# Patient Record
Sex: FEMALE | Race: WHITE | Employment: FULL TIME | URBAN - NONMETROPOLITAN AREA
[De-identification: names, ages, dates, MRNs, and addresses within clinical notes are randomized per-mention and may not be internally consistent; named-entity substitution may affect disease eponyms.]

---

## 2017-11-08 ENCOUNTER — HOSPITAL ENCOUNTER (OUTPATIENT)
Dept: WOMENS IMAGING | Age: 47
Discharge: HOME | End: 2017-11-08
Payer: COMMERCIAL

## 2017-11-08 DIAGNOSIS — Z12.31 VISIT FOR SCREENING MAMMOGRAM: ICD-10-CM

## 2017-11-08 PROCEDURE — 77063 BREAST TOMOSYNTHESIS BI: CPT

## 2018-11-29 ENCOUNTER — HOSPITAL ENCOUNTER (OUTPATIENT)
Dept: WOMENS IMAGING | Age: 48
Discharge: HOME | End: 2018-11-29
Payer: COMMERCIAL

## 2018-11-29 DIAGNOSIS — Z12.31 VISIT FOR SCREENING MAMMOGRAM: ICD-10-CM

## 2018-11-29 PROCEDURE — 77063 BREAST TOMOSYNTHESIS BI: CPT

## 2018-12-10 ENCOUNTER — HOSPITAL ENCOUNTER (OUTPATIENT)
Dept: WOMENS IMAGING | Age: 48
Discharge: HOME | End: 2018-12-10
Payer: COMMERCIAL

## 2018-12-10 DIAGNOSIS — R92.2 BREAST DENSITY: ICD-10-CM

## 2018-12-10 PROCEDURE — 76642 ULTRASOUND BREAST LIMITED: CPT

## 2018-12-10 PROCEDURE — G0279 TOMOSYNTHESIS, MAMMO: HCPCS

## 2019-08-02 ENCOUNTER — HOSPITAL ENCOUNTER (OUTPATIENT)
Dept: WOMENS IMAGING | Age: 49
Discharge: HOME | End: 2019-08-02
Payer: COMMERCIAL

## 2019-08-02 DIAGNOSIS — Z09 FOLLOW UP: ICD-10-CM

## 2019-08-02 DIAGNOSIS — R92.2 BREAST DENSITY: ICD-10-CM

## 2019-08-02 PROCEDURE — G0279 TOMOSYNTHESIS, MAMMO: HCPCS

## 2019-08-13 ENCOUNTER — HOSPITAL ENCOUNTER (OUTPATIENT)
Dept: WOMENS IMAGING | Age: 49
Discharge: HOME | End: 2019-08-13
Payer: COMMERCIAL

## 2019-08-13 DIAGNOSIS — R92.1 CALCIFICATION OF LEFT BREAST: ICD-10-CM

## 2019-08-13 DIAGNOSIS — R92.1 BREAST CALCIFICATIONS: ICD-10-CM

## 2019-08-13 PROCEDURE — 77065 DX MAMMO INCL CAD UNI: CPT

## 2019-08-13 PROCEDURE — A4648 IMPLANTABLE TISSUE MARKER: HCPCS

## 2019-08-13 PROCEDURE — 2709999900 HC NON-CHARGEABLE SUPPLY

## 2019-08-13 PROCEDURE — 88305 TISSUE EXAM BY PATHOLOGIST: CPT

## 2019-08-13 PROCEDURE — 2720000010 HC SURG SUPPLY STERILE

## 2019-08-13 PROCEDURE — 19081 BX BREAST 1ST LESION STRTCTC: CPT

## 2019-08-14 ENCOUNTER — HOSPITAL ENCOUNTER (EMERGENCY)
Age: 49
Discharge: HOME | End: 2019-08-14
Attending: EMERGENCY MEDICINE
Payer: COMMERCIAL

## 2019-08-14 ENCOUNTER — APPOINTMENT (OUTPATIENT)
Dept: INTERVENTIONAL RADIOLOGY/VASCULAR | Age: 49
End: 2019-08-14
Payer: COMMERCIAL

## 2019-08-14 VITALS
SYSTOLIC BLOOD PRESSURE: 135 MMHG | DIASTOLIC BLOOD PRESSURE: 88 MMHG | RESPIRATION RATE: 18 BRPM | HEART RATE: 57 BPM | OXYGEN SATURATION: 100 % | WEIGHT: 157.31 LBS | TEMPERATURE: 98.3 F

## 2019-08-14 DIAGNOSIS — R22.42 LEG MASS, LEFT: ICD-10-CM

## 2019-08-14 DIAGNOSIS — L02.419 CELLULITIS AND ABSCESS OF LEG: Primary | ICD-10-CM

## 2019-08-14 DIAGNOSIS — L03.119 CELLULITIS AND ABSCESS OF LEG: Primary | ICD-10-CM

## 2019-08-14 PROCEDURE — 99215 OFFICE O/P EST HI 40 MIN: CPT

## 2019-08-14 PROCEDURE — 99283 EMERGENCY DEPT VISIT LOW MDM: CPT

## 2019-08-14 PROCEDURE — 93971 EXTREMITY STUDY: CPT

## 2019-08-14 RX ORDER — FAMOTIDINE 20 MG/1
20 TABLET, FILM COATED ORAL 2 TIMES DAILY
COMMUNITY
End: 2022-10-26

## 2019-08-14 RX ORDER — SULFAMETHOXAZOLE AND TRIMETHOPRIM 800; 160 MG/1; MG/1
1 TABLET ORAL 2 TIMES DAILY
Qty: 20 TABLET | Refills: 0 | Status: SHIPPED | OUTPATIENT
Start: 2019-08-14 | End: 2019-08-24

## 2019-08-14 RX ORDER — SULFAMETHOXAZOLE AND TRIMETHOPRIM 800; 160 MG/1; MG/1
1 TABLET ORAL EVERY 12 HOURS SCHEDULED
Status: DISCONTINUED | OUTPATIENT
Start: 2019-08-14 | End: 2019-08-14 | Stop reason: HOSPADM

## 2019-08-14 RX ORDER — ANTIARTHRITIC COMBINATION NO.2 900 MG
TABLET ORAL
COMMUNITY

## 2019-08-14 ASSESSMENT — ENCOUNTER SYMPTOMS
VOMITING: 0
EYE PAIN: 0
SHORTNESS OF BREATH: 0
BACK PAIN: 0
EYE DISCHARGE: 0
WHEEZING: 0
DIARRHEA: 0
NAUSEA: 0
COUGH: 0
RHINORRHEA: 0
SORE THROAT: 0
COLOR CHANGE: 1
ABDOMINAL PAIN: 0

## 2019-08-14 ASSESSMENT — PAIN DESCRIPTION - ORIENTATION
ORIENTATION: LEFT;LOWER
ORIENTATION: LEFT

## 2019-08-14 ASSESSMENT — PAIN DESCRIPTION - PAIN TYPE
TYPE: ACUTE PAIN
TYPE: ACUTE PAIN

## 2019-08-14 ASSESSMENT — PAIN DESCRIPTION - LOCATION
LOCATION: LEG
LOCATION: LEG

## 2019-08-14 ASSESSMENT — PAIN SCALES - GENERAL
PAINLEVEL_OUTOF10: 3
PAINLEVEL_OUTOF10: 5

## 2019-08-14 NOTE — ED PROVIDER NOTES
[address] [facility] [address] [address] EMERGENCY DEPT  Urgent Care Encounter       CHIEF COMPLAINT       Chief Complaint   Patient presents with    Mass     red bump left side, left lower leg       Nurses Notes reviewed and I agree except as noted in the HPI. HISTORY OF PRESENT ILLNESS   Regina Ramos is a 52 y.o. female who presents for evaluation of a firm, erythematous mass to her left lower extremity. Patient states that she first noticed the mass yesterday. She denies any long travel, tobacco use, history of blood clots, hormone/estrogen therapy, or recent surgeries. She denies any known swelling to her left lower extremity or any numbness/tingling. Patient states that she does have pain in her leg when she walks due to the mass. The history is provided by the patient. REVIEW OF SYSTEMS     Review of Systems   Constitutional: Negative for chills and fever. HENT: Negative for congestion and sore throat. Respiratory: Negative for cough and shortness of breath. Cardiovascular: Negative for chest pain. Gastrointestinal: Negative for nausea and vomiting. Musculoskeletal: Positive for arthralgias and gait problem. Negative for myalgias. Skin: Negative for rash. Allergic/Immunologic: Negative for environmental allergies. Neurological: Negative for weakness and headaches. PAST MEDICAL HISTORY         Diagnosis Date    Gastric inflammation        SURGICALHISTORY     Patient  has a past surgical history that includes Breast surgery (2019). CURRENT MEDICATIONS       Previous Medications    FAMOTIDINE (PEPCID) 20 MG TABLET    Take 20 mg by mouth 2 times daily    GLUCOS-CHONDROIT-COLLAG-HYAL (GLUCOSAMINE CHONDROIT-COLLAGEN PO)    Take 1 tablet by mouth    MULTIPLE VITAMINS-MINERALS (WOMENS MULTI) CAPS    Take by mouth       ALLERGIES     Patient is has no allergies on file. Patients   There is no immunization history on file for this patient.     FAMILY HISTORY     Patient's family history reveal a firm mass and there are varicosities noted in the surrounding lower leg. I discussed with the patient that I do have some concern for possible DVT and that I believe evaluation at the emergency department for this would be appropriate. She is agreeable to transfer and states that she would prefer to go to [facility] by private car. Report was called to [facility], Isaiah Wasserman 19 RN.        PATIENT REFERRED TO:  Patrick Munoz MD  [address] [facility] / [place] [address]      DISCHARGE MEDICATIONS:  New Prescriptions    No medications on file       Discontinued Medications    No medications on file       Current Discharge Medication List          Nada Chavez, APRN - CNP    (Please note that portions of this note were completed with a voice recognition program. Efforts were made to edit the dictations but occasionally words are mis-transcribed.)          GILA Conroy CNP  08/14/19 [id number]

## 2019-09-26 ENCOUNTER — HOSPITAL ENCOUNTER (EMERGENCY)
Age: 49
Discharge: HOME | End: 2019-09-26
Payer: MEDICAID

## 2019-09-26 VITALS
WEIGHT: 160 LBS | RESPIRATION RATE: 18 BRPM | HEART RATE: 62 BPM | BODY MASS INDEX: 29.44 KG/M2 | TEMPERATURE: 97.9 F | OXYGEN SATURATION: 97 % | DIASTOLIC BLOOD PRESSURE: 65 MMHG | HEIGHT: 62 IN | SYSTOLIC BLOOD PRESSURE: 134 MMHG

## 2019-09-26 DIAGNOSIS — M79.605 LEFT LEG PAIN: Primary | ICD-10-CM

## 2019-09-26 PROCEDURE — 99203 OFFICE O/P NEW LOW 30 MIN: CPT | Performed by: NURSE PRACTITIONER

## 2019-09-26 PROCEDURE — 99212 OFFICE O/P EST SF 10 MIN: CPT

## 2019-09-26 ASSESSMENT — PAIN DESCRIPTION - FREQUENCY: FREQUENCY: CONTINUOUS

## 2019-09-26 ASSESSMENT — PAIN DESCRIPTION - PROGRESSION: CLINICAL_PROGRESSION: NOT CHANGED

## 2019-09-26 ASSESSMENT — PAIN - FUNCTIONAL ASSESSMENT: PAIN_FUNCTIONAL_ASSESSMENT: ACTIVITIES ARE NOT PREVENTED

## 2019-09-26 ASSESSMENT — ENCOUNTER SYMPTOMS
VOMITING: 0
SHORTNESS OF BREATH: 0
NAUSEA: 0

## 2019-09-26 ASSESSMENT — PAIN SCALES - GENERAL: PAINLEVEL_OUTOF10: 2

## 2019-09-26 ASSESSMENT — PAIN DESCRIPTION - DESCRIPTORS: DESCRIPTORS: DISCOMFORT

## 2019-09-26 ASSESSMENT — PAIN DESCRIPTION - PAIN TYPE: TYPE: ACUTE PAIN

## 2019-09-26 ASSESSMENT — PAIN DESCRIPTION - ORIENTATION: ORIENTATION: LOWER;LEFT

## 2019-09-26 ASSESSMENT — PAIN DESCRIPTION - LOCATION: LOCATION: LEG

## 2020-02-06 ENCOUNTER — HOSPITAL ENCOUNTER (OUTPATIENT)
Dept: WOMENS IMAGING | Age: 50
Discharge: HOME | End: 2020-02-06
Payer: COMMERCIAL

## 2020-02-06 PROCEDURE — G0279 TOMOSYNTHESIS, MAMMO: HCPCS

## 2020-07-02 ENCOUNTER — NURSE ONLY (OUTPATIENT)
Dept: LAB | Age: 50
End: 2020-07-02

## 2020-07-02 LAB
ALT SERPL-CCNC: 21 U/L (ref 11–66)
BASOPHILS # BLD: 1.1 %
BASOPHILS ABSOLUTE: 0.1 THOU/MM3 (ref 0–0.1)
EOSINOPHIL # BLD: 1.7 %
EOSINOPHILS ABSOLUTE: 0.1 THOU/MM3 (ref 0–0.4)
ERYTHROCYTE [DISTWIDTH] IN BLOOD BY AUTOMATED COUNT: 13.2 % (ref 11.5–14.5)
ERYTHROCYTE [DISTWIDTH] IN BLOOD BY AUTOMATED COUNT: 50.8 FL (ref 35–45)
HCT VFR BLD CALC: 42 % (ref 37–47)
HEMOGLOBIN: 14 GM/DL (ref 12–16)
IMMATURE GRANS (ABS): 0.01 THOU/MM3 (ref 0–0.07)
IMMATURE GRANULOCYTES: 0.2 %
LYMPHOCYTES # BLD: 44.7 %
LYMPHOCYTES ABSOLUTE: 2.1 THOU/MM3 (ref 1–4.8)
MCH RBC QN AUTO: 34.5 PG (ref 26–33)
MCHC RBC AUTO-ENTMCNC: 33.3 GM/DL (ref 32.2–35.5)
MCV RBC AUTO: 103.4 FL (ref 81–99)
MONOCYTES # BLD: 10 %
MONOCYTES ABSOLUTE: 0.5 THOU/MM3 (ref 0.4–1.3)
NUCLEATED RED BLOOD CELLS: 0 /100 WBC
PLATELET # BLD: 259 THOU/MM3 (ref 130–400)
PMV BLD AUTO: 9.9 FL (ref 9.4–12.4)
RBC # BLD: 4.06 MILL/MM3 (ref 4.2–5.4)
SEG NEUTROPHILS: 42.3 %
SEGMENTED NEUTROPHILS ABSOLUTE COUNT: 2 THOU/MM3 (ref 1.8–7.7)
T4 FREE: 1.3 NG/DL (ref 0.93–1.76)
TSH SERPL DL<=0.05 MIU/L-ACNC: 1.2 UIU/ML (ref 0.4–4.2)
WBC # BLD: 4.7 THOU/MM3 (ref 4.8–10.8)

## 2021-02-18 ENCOUNTER — HOSPITAL ENCOUNTER (OUTPATIENT)
Dept: WOMENS IMAGING | Age: 51
Discharge: HOME | End: 2021-02-18
Payer: COMMERCIAL

## 2021-02-18 DIAGNOSIS — Z12.31 VISIT FOR SCREENING MAMMOGRAM: ICD-10-CM

## 2021-02-18 PROCEDURE — 77063 BREAST TOMOSYNTHESIS BI: CPT

## 2021-07-30 ENCOUNTER — HOSPITAL ENCOUNTER (OUTPATIENT)
Dept: GENERAL RADIOLOGY | Age: 51
Discharge: HOME | End: 2021-07-30
Payer: COMMERCIAL

## 2021-07-30 ENCOUNTER — HOSPITAL ENCOUNTER (OUTPATIENT)
Age: 51
Discharge: HOME | End: 2021-07-30
Payer: COMMERCIAL

## 2021-07-30 DIAGNOSIS — M25.562 LEFT KNEE PAIN, UNSPECIFIED CHRONICITY: ICD-10-CM

## 2021-07-30 PROCEDURE — 73564 X-RAY EXAM KNEE 4 OR MORE: CPT

## 2021-09-09 ENCOUNTER — HOSPITAL ENCOUNTER (OUTPATIENT)
Age: 51
Discharge: HOME | End: 2021-09-09
Payer: COMMERCIAL

## 2021-09-09 ENCOUNTER — HOSPITAL ENCOUNTER (OUTPATIENT)
Dept: GENERAL RADIOLOGY | Age: 51
Discharge: HOME | End: 2021-09-09
Payer: COMMERCIAL

## 2021-09-09 DIAGNOSIS — M48.02 CERVICAL SPINAL STENOSIS: ICD-10-CM

## 2021-09-09 LAB
ERYTHROCYTE [DISTWIDTH] IN BLOOD BY AUTOMATED COUNT: 13.6 % (ref 11.5–14.5)
ERYTHROCYTE [DISTWIDTH] IN BLOOD BY AUTOMATED COUNT: 51.8 FL (ref 35–45)
HCT VFR BLD CALC: 43.8 % (ref 37–47)
HEMOGLOBIN: 14 GM/DL (ref 12–16)
MCH RBC QN AUTO: 33 PG (ref 26–33)
MCHC RBC AUTO-ENTMCNC: 32 GM/DL (ref 32.2–35.5)
MCV RBC AUTO: 103.3 FL (ref 81–99)
PLATELET # BLD: 257 THOU/MM3 (ref 130–400)
PMV BLD AUTO: 10.1 FL (ref 9.4–12.4)
RBC # BLD: 4.24 MILL/MM3 (ref 4.2–5.4)
WBC # BLD: 4.6 THOU/MM3 (ref 4.8–10.8)

## 2021-09-09 PROCEDURE — 85610 PROTHROMBIN TIME: CPT

## 2021-09-09 PROCEDURE — 71046 X-RAY EXAM CHEST 2 VIEWS: CPT

## 2021-09-09 PROCEDURE — 87641 MR-STAPH DNA AMP PROBE: CPT

## 2021-09-09 PROCEDURE — 85027 COMPLETE CBC AUTOMATED: CPT

## 2021-09-09 PROCEDURE — 80048 BASIC METABOLIC PNL TOTAL CA: CPT

## 2021-09-09 PROCEDURE — 85730 THROMBOPLASTIN TIME PARTIAL: CPT

## 2021-09-09 PROCEDURE — 93005 ELECTROCARDIOGRAM TRACING: CPT | Performed by: PHYSICIAN ASSISTANT

## 2021-09-09 PROCEDURE — 36415 COLL VENOUS BLD VENIPUNCTURE: CPT

## 2021-09-10 LAB
ANION GAP SERPL CALCULATED.3IONS-SCNC: 13 MEQ/L (ref 8–16)
APTT: 33.8 SECONDS (ref 22–38)
BUN BLDV-MCNC: 10 MG/DL (ref 7–22)
CALCIUM SERPL-MCNC: 9.6 MG/DL (ref 8.5–10.5)
CHLORIDE BLD-SCNC: 100 MEQ/L (ref 98–111)
CO2: 26 MEQ/L (ref 23–33)
CREAT SERPL-MCNC: 0.6 MG/DL (ref 0.4–1.2)
EKG ATRIAL RATE: 76 BPM
EKG P AXIS: 59 DEGREES
EKG P-R INTERVAL: 144 MS
EKG Q-T INTERVAL: 374 MS
EKG QRS DURATION: 78 MS
EKG QTC CALCULATION (BAZETT): 420 MS
EKG R AXIS: 24 DEGREES
EKG T AXIS: 46 DEGREES
EKG VENTRICULAR RATE: 76 BPM
GFR SERPL CREATININE-BSD FRML MDRD: > 90 ML/MIN/1.73M2
GLUCOSE BLD-MCNC: 100 MG/DL (ref 70–108)
INR BLD: 0.97 (ref 0.85–1.13)
MRSA SCREEN RT-PCR: NEGATIVE
POTASSIUM SERPL-SCNC: 4.2 MEQ/L (ref 3.5–5.2)
SODIUM BLD-SCNC: 139 MEQ/L (ref 135–145)

## 2021-09-27 ENCOUNTER — NURSE ONLY (OUTPATIENT)
Dept: LAB | Age: 51
End: 2021-09-27

## 2021-09-27 DIAGNOSIS — Z13.6 SCREENING FOR ISCHEMIC HEART DISEASE: ICD-10-CM

## 2021-09-27 LAB
CHOLESTEROL, TOTAL: 224 MG/DL (ref 100–199)
HDLC SERPL-MCNC: 94 MG/DL
LDL CHOLESTEROL CALCULATED: 108 MG/DL
TRIGL SERPL-MCNC: 110 MG/DL (ref 0–199)

## 2022-02-21 ENCOUNTER — HOSPITAL ENCOUNTER (OUTPATIENT)
Dept: WOMENS IMAGING | Age: 52
Discharge: HOME | End: 2022-02-21
Payer: COMMERCIAL

## 2022-02-21 DIAGNOSIS — Z12.31 VISIT FOR SCREENING MAMMOGRAM: ICD-10-CM

## 2022-02-21 PROCEDURE — 77063 BREAST TOMOSYNTHESIS BI: CPT

## 2022-11-07 ENCOUNTER — INITIAL CONSULT (OUTPATIENT)
Dept: PULMONOLOGY | Age: 52
End: 2022-11-07
Payer: COMMERCIAL

## 2022-11-07 VITALS
SYSTOLIC BLOOD PRESSURE: 148 MMHG | DIASTOLIC BLOOD PRESSURE: 90 MMHG | HEART RATE: 80 BPM | WEIGHT: 201.8 LBS | TEMPERATURE: 97.8 F | HEIGHT: 62 IN | BODY MASS INDEX: 37.13 KG/M2 | OXYGEN SATURATION: 98 %

## 2022-11-07 DIAGNOSIS — G47.9 SLEEP DISTURBANCE: ICD-10-CM

## 2022-11-07 DIAGNOSIS — E66.9 OBESITY (BMI 30-39.9): ICD-10-CM

## 2022-11-07 DIAGNOSIS — G47.19 EXCESSIVE DAYTIME SLEEPINESS: Primary | ICD-10-CM

## 2022-11-07 DIAGNOSIS — R06.83 SNORING: ICD-10-CM

## 2022-11-07 DIAGNOSIS — K21.9 GASTROESOPHAGEAL REFLUX DISEASE WITHOUT ESOPHAGITIS: ICD-10-CM

## 2022-11-07 DIAGNOSIS — G47.8 UNREFRESHED BY SLEEP: ICD-10-CM

## 2022-11-07 PROCEDURE — 99203 OFFICE O/P NEW LOW 30 MIN: CPT | Performed by: INTERNAL MEDICINE

## 2022-11-07 NOTE — PROGRESS NOTES
New Sleep Patient H/P    Presentation:  Mallorie Stinson is referred by Soto Stone MD for  [place]    Shelby c/o 40 lb wt gain over the last few years, loud snoring, non restorative, fragmented  sleep, feeling unrefreshed in the morning, difficulty concentrating at work. Her blood pressure has been elevated at the doctors offices lately---152/92 today    H/O Grave's disease, GERD      Time in Bed:   Bedtime: 12a.m. Awakens  7 a.m. Different on weekends? No       Shelby falls asleep in 25  minutes. Any awakenings? Yes  Difficulty Falling back to sleep? Yes  Symptoms began:  a few years ago. Symptoms include: snoring, excessive daytime sleepiness, disrupted sleep    Previous evaluation and treatment? No        She denies any history of sleep walking or sleep talking. No history of seizures activity. No history suggestive of restless legs syndrome. No history of bruxism. No history of head injury. Naps:  Any naps? No and are they helpful No    Snoring and Apneas:  Do you snore or been told you a snore? Yes  How long have known about your snoring? years  Any witnessed apneas? No  Any awakenings with choking or gasping? No    Dreams:  Any recurring dreams? No  Hallucinations? No  Sleep Paralysis? No  Symptoms of Cataplexy? No    Driving History:  Do you have a CDL or drive long distances for work? No  Any driving accidents in the past year? No  Any sleepiness while driving? No    Weight:  Any change in weight over the past year? No   How about past 5 years?  No  How much? 40      Past Medical History:   Diagnosis Date    Gastric inflammation        Past Surgical History:   Procedure Laterality Date    BREAST SURGERY  2019    biopsy, left breast    TE STEROTACTIC LOC BREAST BIOPSY LEFT Left 08/13/2019    benign    SPINE SURGERY N/A 09/29/2021    fusion - [facility]       Social History     Tobacco Use    Smoking status: Never    Smokeless tobacco: Never   Vaping Use    Vaping Use: Never used   Substance Use Topics    Alcohol use: Yes     Comment: social    Drug use: Never       Allergies   Allergen Reactions    Erythromycin     Tetracyclines & Related        Current Outpatient Medications   Medication Sig Dispense Refill    valACYclovir (VALTREX) 500 MG tablet take 1 tablet by mouth once daily      omeprazole (PRILOSEC) 40 MG delayed release capsule Take 1 capsule by mouth daily 30 capsule 11    Multiple Vitamins-Minerals (WOMENS MULTI) CAPS Take by mouth      Glucos-Chondroit-Collag-Hyal (GLUCOSAMINE CHONDROIT-COLLAGEN PO) Take 1 tablet by mouth       No current facility-administered medications for this visit. Family History   Problem Relation Age of Onset    Heart Disease Mother     Diabetes Father     High Cholesterol Father     Breast Cancer Paternal Cousin 43        Any family history of any sleep problems or any one in your family on CPAP? No    Social History     Tobacco Use    Smoking status: Never    Smokeless tobacco: Never   Vaping Use    Vaping Use: Never used   Substance Use Topics    Alcohol use: Yes     Comment: social    Drug use: Never     Caffeine Intake: How much soda (pop), coffee, tea, power drinks do you ingest per day? 1 per day. Employment History:  Where do you work?    What are your shifts? 8a to 3 p    Any recent changes in shifts or hours? No    Review of Systems:   General/Constitutional: No recent loss of weight or appetite changes. No fever or chills. HENT: Negative. Eyes: Negative. Upper respiratory tract: No nasal stuffiness or post nasal drip. Lower respiratory tract/ lungs: No cough or sputum production. No hemoptysis. Cardiovascular: No palpitations or chest pain. Gastrointestinal: No nausea or vomiting. Neurological: No focal neurologiacal weakness. Extremities: No edema. Musculoskeletal: No complaints. Genitourinary: No complaints. Hematological: Negative. Psychiatric/Behavioral: Negative. Skin: No itching.   Physical Exam:    HEIGHTHeight: 5' 2\" (157.5 cm) WEIGHTWeight: 201 lb 12.8 oz (91.5 kg)    BMI:  There is no height or weight on file to calculate BMI. Neck Size: 14.75      ESS: 3 SAQLI:67  Vitals:   Vitals:    11/07/22 1042 11/07/22 1051   BP: (!) 152/92 (!) 148/90   Site: Left Upper Arm    Position: Sitting    Cuff Size: Large Adult    Pulse: 80    Temp: 97.8 °F (36.6 °C)    TempSrc: Oral    SpO2: 98%  Comment: room    Weight: 201 lb 12.8 oz (91.5 kg)    Height: 5' 2\" (1.575 m)          Mallampati Score: 4    Physical Exam :  Constitutional: BMI 36.91  HENT:   Head: Normocephalic and atraumatic. Mouth/Throat: Large tongue, crowded pharynx, mallampati class 4  Eyes: Conjunctivae are normal. PERRLA. No scleral icterus. Neck: Neck supple. No JVD present. No tracheal deviation present. Cardiovascular: Normal rate, regular rhythm, normal heart sounds. No murmur heard. Pulmonary/Chest: Effort normal and breath sounds normal. No stridor. No respiratory distress. No wheezes. No rales. Abdominal: Soft. No distension. No tenderness. Musculoskeletal: Normal range of motion. Lymphadenopathy:  No cervical adenopathy. Neurological: Alert and oriented to person, place, and time. No focal deficits. Skin: Skin is warm and dry. Patient is not diaphoretic. Psychiatric: Normal behavior with normal mood and affect. Diagnostic Data:    Assessment      Diagnosis Orders   1. Excessive daytime sleepiness  Baseline Diagnostic Sleep Study      2. Snoring  Baseline Diagnostic Sleep Study      3. Unrefreshed by sleep  Baseline Diagnostic Sleep Study      4. Obesity (BMI 30-39. 9)  Baseline Diagnostic Sleep Study      5. Gastroesophageal reflux disease without esophagitis  Baseline Diagnostic Sleep Study      6.  Sleep disturbance  Baseline Diagnostic Sleep Study            Plan   Orders Placed This Encounter   Procedures    Baseline Diagnostic Sleep Study     Standing Status:   Future     Standing Expiration Date:   11/7/2023 Order Specific Question:   Adult or Pediatric     Answer:   Adult Study (>7 Years)         Mask Desensitization and Pre study teaching? No  Weight Loss Information Given? Yes  Sleep Hygiene Discussed? Yes    -She was advised to call [employer] regarding supplies if needed.  -She call my office for earlier appointment if needed for worsening of sleep symptoms.  -Julian Mneg educated about my impression and plan. Patient verbalizes understanding.

## 2022-12-12 ENCOUNTER — HOSPITAL ENCOUNTER (OUTPATIENT)
Dept: PHYSICAL THERAPY | Age: 52
Setting detail: THERAPIES SERIES
Discharge: HOME | End: 2022-12-12
Payer: COMMERCIAL

## 2022-12-12 PROCEDURE — 97161 PT EVAL LOW COMPLEX 20 MIN: CPT

## 2022-12-12 NOTE — PROGRESS NOTES
** PLEASE SIGN, DATE AND TIME CERTIFICATION BELOW AND RETURN TO [facility] OUTPATIENT REHABILITATION (FAX #: [phone]). ATTEST/CO-SIGN IF ACCESSING VIA [employer]. THANK YOU.**    I certify that I have examined the patient below and determined that Physical Medicine and Rehabilitation service is necessary and that I approve the established plan of care for up to 90 days or as specifically noted. Attestation, signature or co-signature of physician indicates approval of certification requirements.    ________________________ ____________ __________  Physician Signature   Date   Time  [address] [facility]  PHYSICAL THERAPY  [x] EVALUATION  [] DAILY NOTE (LAND) [] DAILY NOTE (AQUATIC ) [] PROGRESS NOTE [] DISCHARGE NOTE    [x] [address] [facility]   [] [address]    [] [address]   [] German Ramírez    Date: 2022  Patient Name:  Blaise Dewey  : 1970  MRN: [id number]  CSN: [id number]    Referring Practitioner Freeman Zuniga MD   Diagnosis Encounter for other orthopedic aftercare [Z47.89]  Cervicalgia [M54.2]    Treatment Diagnosis Cervical pain   Date of Evaluation 22    Additional Pertinent History ACDF 2021, Arthritis      Functional Outcome Measure Used Neck Disability Index   Functional Outcome Score 14% impaired (22)       Insurance: Primary: Payor: Vee Lang /  /  / ,   Secondary:  SPECIALTY BILLING   Authorization Information: PRE CERTIFICATION REQUIRED: YES, AFTER EVAL THRU ABIGAIL @ [phone]  INSURANCE THERAPY BENEFIT:  20 VISITS  [address] THERAPY COVERED: YES  MODALITIES COVERED:  YES   Visit # 1, 1/10 for progress note   Visits Allowed: 1   Recertification Date:    Physician Follow-Up: Does not know of a follow up with doctor   Physician Orders: Eval and treat   History of Present Illness: Patient had surgery on neck in 2021.   Patient reports when she sits for a longer period of time  and when she looks up, she has pain. Patient reports at work if she's sitting and starts having pain, she gets up and walks which helps the pain. Reports her work station is pretty ergonomic. SUBJECTIVE: Pain in neck getting worse gradually since surgery. Social/Functional History and Current Status:  Medications and Allergies have been reviewed and are listed on Medical History Questionnaire. Loring Litten lives with significant other in a single story home with stairs and a handrail to enter. Task Previous Current   ADLs  Independent Independent   IADL's Independent Independent   Ambulation Independent Independent   Transfers Independent Independent   Recreation Independent Independent   Community Integration Independent Independent   Driving Active  Active    Work [employer]. Occupation: -sitting most of the day at work   [employer]. OBJECTIVE:    Pain: 6/10 neck and sometimes down right shoulder   Palpation Significant increased tightness bilateral upper trap and neck   Posture Slight forward head, forward rounded shoulders   Range of Motion Cervical=50% loss. (ACDF 9/21)   Strength WFL   Coordination WFL   Sensation Intermittent tingling in lower cervical spine   Bed Mobility NT   Transfers Mod I   Ambulation Ambulates with no AD, good pace   Stairs NT   Balance Safe with no AD   Sleeping Sleeps on stomach. Sleeps with 1 pillow under head. Has a firm, supportive mattress.          TREATMENT   Precautions: ACDF September 2021, Arthritis   Pain: 6/10 neck    \"X in shaded column indicates activity completed today    *\" next to exercise/intervention indicates progression   Modalities Parameters/  Location  Notes                     Manual Therapy Time/Technique  Notes                     Exercise/  Intervention   Notes                                                                                  Specific Interventions Next Treatment: Modalities as needed for cervical pain and tightness, cervical stretching and strengthening, posture    Activity/Treatment Tolerance:  [x]  Patient tolerated treatment well  []  Patient limited by fatigue  []  Patient limited by pain   []  Patient limited by medical complications  []  Other:     Assessment: 46year old presents to therapy following surgery on neck in 2021. She is tight and is having increased pain with certain activities. She will benefit from therapy to assist with decreasing pain, decreasing tightness, increasing cervical ROM. Body Structures/Functions/Activity Limitations: impaired activity tolerance, impaired endurance, impaired ROM, impaired sensation, impaired strength, pain, and abnormal posture  Prognosis: good    GOALS:  Patient Goal: \"I would like to be able to move my neck better, get rid of tingling in neck, and to not have pain when I look up. \"    Short Term Goals:  Time Frame: 4 weeks  Improve NDI to <10% impaired to assist with duties at work. Decrease pain in neck to no more than 5/10 to assist with sitting at work. Improve cervical ROM to 25% loss or less to assist with turning head when driving. Improve posture needing no cues for correction to assist with preventing injury. Patient able to sit at work for 30 minutes with no increase in pain to assist with daily work duties. Long Term Goals:  Time Frame: 12 weeks  Independent with HEP and with progression to assist with decreasing pain. Patient Education:   [x]  HEP/Education Completed: Plan of Care, Goals  [facility] Access Code:  []  No new Education completed  []  Reviewed Prior HEP      []  Patient verbalized and/or demonstrated understanding of education provided. []  Patient unable to verbalize and/or demonstrate understanding of education provided. Will continue education.   [x]  Barriers to learning: none per patient    PLAN:  Treatment Recommendations: Strengthening, Range of Motion, Balance Training, Functional Mobility Training, Manual Therapy - Soft Tissue Mobilization, Pain Management, Home Exercise Program, Patient Education, Safety Education and Training, Self-Care Education and Training, Positioning, Integrative Dry Needling, Aquatics, and Modalities    [x]  Plan of care initiated. Plan to see patient 2 times per week for up to 12 weeks to address the treatment planned outlined above.   []  Continue with current plan of care  []  Modify plan of care as follows:    []  Hold pending physician visit  []  Discharge    Time In 0900   Time Out 0955   Timed Code Minutes: 0 min   Total Treatment Time: 55 min       Electronically Signed by: Sachin Thomas PT

## 2022-12-15 ENCOUNTER — HOSPITAL ENCOUNTER (OUTPATIENT)
Dept: PHYSICAL THERAPY | Age: 52
Setting detail: THERAPIES SERIES
Discharge: HOME | End: 2022-12-15
Payer: COMMERCIAL

## 2022-12-15 PROCEDURE — 97110 THERAPEUTIC EXERCISES: CPT

## 2022-12-15 PROCEDURE — 97140 MANUAL THERAPY 1/> REGIONS: CPT

## 2022-12-15 NOTE — PROGRESS NOTES
[address] [facility]  PHYSICAL THERAPY  [] EVALUATION  [x] DAILY NOTE (LAND) [] DAILY NOTE (AQUATIC ) [] PROGRESS NOTE [] DISCHARGE NOTE    [x] OUTPATIENT REHABILITATION CENTER [facility]   [] [place] [address]    [] [facility]   [] Deana Zhou    Date: 12/15/2022  Patient Name:  Brenna Marrero  : 1970  MRN: [id number]  CSN: [id number]    Referring Practitioner Brian Vogel MD   Diagnosis Encounter for other orthopedic aftercare [Z47.89]  Cervicalgia [M54.2]    Treatment Diagnosis Cervical pain   Date of Evaluation 22    Additional Pertinent History ACDF 2021, Arthritis      Functional Outcome Measure Used Neck Disability Index   Functional Outcome Score 14% impaired (22)       Insurance: Primary: Payor: [employer] /  /  / ,   Secondary:    Authorization Information: PRE CERTIFICATION REQUIRED: YES, AFTER EVAL THRU ABIGAIL @ [phone]  INSURANCE THERAPY BENEFIT:  20 VISITS PER [facility] 7: YES  MODALITIES COVERED:  YES  -RECEIVED AUTH FOR PT  TOTAL OF 10 VISITS   FROM 22 TO 22  NO SPECIFIC CPT CODES   Visit # 2,  for progress note   Visits Allowed: 11   Recertification Date:    Physician Follow-Up: Does not know of a follow up with doctor   Physician Orders: Eval and treat   History of Present Illness: Patient had surgery on neck in 2021. Patient reports when she sits for a longer period of time  and when she looks up, she has pain. Patient reports at work if she's sitting and starts having pain, she gets up and walks which helps the pain. Reports her work station is pretty ergonomic. SUBJECTIVE: Pt feels neck is doing ok for the most part. Today, she feels ROM is doing ok. She usually feels \"creaky\" in neck in the mornings.  Pain is situational.         TREATMENT   Precautions: ACDF 2021, Arthritis   Pain: 0/10 neck    \"X in shaded column indicates activity completed today    *\" next to exercise/intervention indicates progression   Modalities Parameters/  Location  Notes                     Manual Therapy Time/Technique  Notes   Gentle traction, suboccipital release, MFR to B upper trap 8 min x                Exercise/  Intervention   Notes   Backward shoulder rolls 10  x    Scapular retraction 2x10x3 sec  x    Cervical retraction 2x10x3 sec  x    Cervical rotation 10x3 sec  x    Upper trap and levator stretch 3x20 sec ea  x Limited range with right levator   Upper thoracic stretch at // bars 3x20 sec  x    Cervical isometrics: flexion, extension, rotation, lateral flexion 5x5 sec  x                                  Specific Interventions Next Treatment: Modalities as needed for cervical pain and tightness, cervical stretching and strengthening, posture    Activity/Treatment Tolerance:  [x]  Patient tolerated treatment well  []  Patient limited by fatigue  []  Patient limited by pain   []  Patient limited by medical complications  []  Other:     Assessment: Initiated cervical ROM and gentle strengthening program. Ended with gentle manual work, which pt notes helped. Pt c/o tingling sensation in posterior shoulder/scpaula during session. GOALS:  Patient Goal: \"I would like to be able to move my neck better, get rid of tingling in neck, and to not have pain when I look up. \"    Short Term Goals:  Time Frame: 4 weeks  Improve NDI to <10% impaired to assist with duties at work. Decrease pain in neck to no more than 5/10 to assist with sitting at work. Improve cervical ROM to 25% loss or less to assist with turning head when driving. Improve posture needing no cues for correction to assist with preventing injury. Patient able to sit at work for 30 minutes with no increase in pain to assist with daily work duties. Long Term Goals:  Time Frame: 12 weeks  Independent with HEP and with progression to assist with decreasing pain.         Patient Education:   [x]  HEP/Education Completed: ROM exercises completed above with handout provided  [email or web address] Access Code: [address]  []  No new Education completed  []  Reviewed Prior HEP      [x]  Patient verbalized and/or demonstrated understanding of education provided. []  Patient unable to verbalize and/or demonstrate understanding of education provided. Will continue education. []  Barriers to learning: none per patient    PLAN:  Treatment Recommendations: Strengthening, Range of Motion, Balance Training, Functional Mobility Training, Manual Therapy - Soft Tissue Mobilization, Pain Management, Home Exercise Program, Patient Education, Safety Education and Training, Self-Care Education and Training, Positioning, Integrative Dry Needling, Aquatics, and Modalities    []  Plan of care initiated. Plan to see patient 2 times per week for up to 12 weeks to address the treatment planned outlined above.   [x]  Continue with current plan of care  []  Modify plan of care as follows:    []  Hold pending physician visit  []  Discharge    Time In 0831   Time Out 0909   Timed Code Minutes: 38 min   Total Treatment Time: 38 min       Electronically Signed by: Aron Moncada, PT

## 2022-12-19 ENCOUNTER — HOSPITAL ENCOUNTER (OUTPATIENT)
Dept: SLEEP CENTER | Age: 52
Discharge: HOME | End: 2022-12-21
Payer: COMMERCIAL

## 2022-12-19 ENCOUNTER — APPOINTMENT (OUTPATIENT)
Dept: PHYSICAL THERAPY | Age: 52
End: 2022-12-19
Payer: COMMERCIAL

## 2022-12-19 DIAGNOSIS — G47.9 SLEEP DISTURBANCE: ICD-10-CM

## 2022-12-19 DIAGNOSIS — R06.83 SNORING: ICD-10-CM

## 2022-12-19 DIAGNOSIS — E66.9 OBESITY (BMI 30-39.9): ICD-10-CM

## 2022-12-19 DIAGNOSIS — G47.8 UNREFRESHED BY SLEEP: ICD-10-CM

## 2022-12-19 DIAGNOSIS — G47.19 EXCESSIVE DAYTIME SLEEPINESS: ICD-10-CM

## 2022-12-19 DIAGNOSIS — K21.9 GASTROESOPHAGEAL REFLUX DISEASE WITHOUT ESOPHAGITIS: ICD-10-CM

## 2022-12-19 PROCEDURE — 95810 POLYSOM 6/> YRS 4/> PARAM: CPT

## 2022-12-20 LAB — STATUS: NORMAL

## 2022-12-21 ENCOUNTER — HOSPITAL ENCOUNTER (OUTPATIENT)
Dept: PHYSICAL THERAPY | Age: 52
Setting detail: THERAPIES SERIES
Discharge: HOME | End: 2022-12-21
Payer: COMMERCIAL

## 2022-12-21 PROCEDURE — 97140 MANUAL THERAPY 1/> REGIONS: CPT

## 2022-12-21 PROCEDURE — 97110 THERAPEUTIC EXERCISES: CPT

## 2022-12-21 NOTE — PROGRESS NOTES
[address] [facility]  PHYSICAL THERAPY  [] EVALUATION  [x] DAILY NOTE (LAND) [] DAILY NOTE (AQUATIC ) [] PROGRESS NOTE [] DISCHARGE NOTE    [x] OUTPATIENT REHABILITATION CENTER [facility]   [] [place] [address]    [] [facility]   [] Ritu Batch    Date: 2022  Patient Name:  Rishi Robles  : 1970  MRN: [id number]  CSN: [id number]    Referring Practitioner Rosa Mccollum MD   Diagnosis Encounter for other orthopedic aftercare [Z47.89]  Cervicalgia [M54.2]    Treatment Diagnosis Cervical pain   Date of Evaluation 22    Additional Pertinent History ACDF 2021, Arthritis      Functional Outcome Measure Used Neck Disability Index   Functional Outcome Score 14% impaired (22)       Insurance: Primary: Payor: Renato Grant /  /  / ,   Secondary:    Authorization Information: PRE CERTIFICATION REQUIRED: YES, AFTER EVAL THRU ABIGAIL @ [phone]  INSURANCE THERAPY BENEFIT:  [facility] YES  MODALITIES COVERED:  YES  -RECEIVED AUTH FOR PT  TOTAL OF 10 VISITS   FROM 22 TO 22  NO SPECIFIC CPT CODES   Visit # 3, 3/11 for progress note   Visits Allowed: 11   Recertification Date:    Physician Follow-Up: Does not know of a follow up with doctor   Physician Orders: Eval and treat   History of Present Illness: Patient had surgery on neck in 2021. Patient reports when she sits for a longer period of time  and when she looks up, she has pain. Patient reports at work if she's sitting and starts having pain, she gets up and walks which helps the pain. Reports her work station is pretty ergonomic. SUBJECTIVE: Patient states she had some tingling between the shoulder blades after last therapy session but no increased pain. Reports pain is usually worse while she is at work.        TREATMENT   Precautions: ACDF 2021, Arthritis   Pain: 0/10 neck    \"X in shaded column indicates activity completed today    *\" next to exercise/intervention indicates progression   Modalities Parameters/  Location  Notes                     Manual Therapy Time/Technique  Notes   Gentle traction, suboccipital release, MFR to B upper trap 8 min x                Exercise/  Intervention   Notes   Backward shoulder rolls 15*x  X    Scapular retraction 2x10x3 sec  X    Cervical retraction 2x10x3 sec  X    Cervical rotation 10x3 sec  X    Upper trap and levator stretch 3x20 sec ea  X Limited range with right levator   Upper thoracic stretch at // bars 3x20 sec  X    Posterior capsule stretch * 3x20 sec  X    Cervical isometrics: flexion, extension, rotation, lateral flexion 5x5 sec  X                                  Specific Interventions Next Treatment: Modalities as needed for cervical pain and tightness, cervical stretching and strengthening, posture    Activity/Treatment Tolerance:  [x]  Patient tolerated treatment well  []  Patient limited by fatigue  []  Patient limited by pain   []  Patient limited by medical complications  []  Other:     Assessment: Continued with stretches and strengthening as noted above and concluded session with manual work. Tightness noted during manual techniques but patient felt better after and denies pain at conclusion of therapy. Cervical retractions produced mild tingling sensation at lower cervical/upper thoracic region. GOALS:  Patient Goal: \"I would like to be able to move my neck better, get rid of tingling in neck, and to not have pain when I look up. \"    Short Term Goals:  Time Frame: 4 weeks  Improve NDI to <10% impaired to assist with duties at work. Decrease pain in neck to no more than 5/10 to assist with sitting at work. Improve cervical ROM to 25% loss or less to assist with turning head when driving. Improve posture needing no cues for correction to assist with preventing injury.   Patient able to sit at work for 30 minutes with no increase in pain to assist with daily work duties. Long Term Goals:  Time Frame: 12 weeks  Independent with HEP and with progression to assist with decreasing pain. Patient Education:   [x]  HEP/Education Completed: Continue HEP, monitor pain and tightness, use of heat/ice  Medbridge Access Code: Jarad Mcdowell  []  No new Education completed  []  Reviewed Prior HEP      [x]  Patient verbalized and/or demonstrated understanding of education provided. []  Patient unable to verbalize and/or demonstrate understanding of education provided. Will continue education. []  Barriers to learning: none per patient    PLAN:  Treatment Recommendations: Strengthening, Range of Motion, Balance Training, Functional Mobility Training, Manual Therapy - Soft Tissue Mobilization, Pain Management, Home Exercise Program, Patient Education, Safety Education and Training, Self-Care Education and Training, Positioning, Integrative Dry Needling, Aquatics, and Modalities    []  Plan of care initiated. Plan to see patient 2 times per week for up to 12 weeks to address the treatment planned outlined above.   [x]  Continue with current plan of care  []  Modify plan of care as follows:    []  Hold pending physician visit  []  Discharge    Time In 0830   Time Out 0912   Timed Code Minutes: 42 min   Total Treatment Time: 42 min       Electronically Signed by: Beny Wei PTA

## 2022-12-21 NOTE — PROGRESS NOTES
[address] [place] [address]                               SLEEP STUDY REPORT    PATIENT NAME: Aleksandar Pearce               :        1970  MED REC NO:   [id number]                           ROOM:  ACCOUNT NO:   [de-identified]                           ADMIT DATE: 2022  PROVIDER:     Lakshmi Rodriguez. MD Marielena    DATE OF STUDY:  2022    REFERRING PROVIDER:  Nandini Myers MD    The patient's height is 62 inches, weight is 201 pounds with a BMI of  36.8. HISTORY:  The patient is a 78-year-old female who was initially  evaluated by Ge Ho MD, on 2022. The patient gave a history  of snoring and unrefreshed sleep. The patient is scheduled for sleep  study to evaluate for sleep apnea as an etiology for hypersomnia. METHODS:  The patient underwent digital polysomnography in compliance  with the standards and specifications from the AASM Manual including the  simultaneous recording of 3 EEG channels (F4-M1, C4-M1, and O2-M1 with  back up electrodes F3-M2, C3-M2, and O1-M2), 2 EOG channels (E1-M2, and  E2-M1,), EMG (chin, left & right leg), EKG, Nonin pulse oximetry with   less than 2 second averaging time, body position, airflow recorded by  oral-nasal thermal sensor and nasal air pressure transducer, plus  respiratory effort recorded by calibrated respiratory inductance  plethysmography (RIP), flow volume loop, sound and video. Sleep staging  and scoring followed the standard put forth by the American Academy of  Sleep Medicine and utilized the 1B obstructive hypopnea event  desaturation of 4 percent or greater. INTERPRETATION:  This is a baseline sleep study and the study was  performed on 2022.   The study was started at 10:20 p.m. and was  terminated at 05:39 a.m. with a total recording time of 439.2 minutes,  sleep period time was 426.3 minutes, total sleep time was 377.3 minutes,  and overall sleep efficiency was 85.9%. The sleep onset latency was  12.9 minutes, wake after sleep onset was 49 minutes, and REM sleep  latency was 369.5 minutes. SLEEP STAGING AND DISTRIBUTION SUMMARY:  Revealed the patient spent  103.5 minutes in stage I consisting of 27.4% of total sleep time, 219  minutes in stage II consisting of 58%, 54.8 minutes in REM sleep  consisting of 14.5% of total sleep time. The patient had absent slow  wave sleep. RESPIRATORY EVENT ANALYSIS:  Revealed the patient had a total of 302  apneas. Out of 302, 274 were obstructive and 27 were central and 1 was  mixed in nature. The patient also had a total of 89 hypopneas, all of  them were obstructive in nature. The total number of apneas and  hypopneas recorded during the study was 391 with an apnea-hypopnea index  of 62.2. The patient's REM sleep apnea-hypopnea index was 48.1. POSITION ANALYSIS:  Revealed the patient spent 61.5 minutes in supine  position, 278.2 minutes in prone position, 0.4 minutes in left lateral  position, 37.2 minutes in right lateral position with a supine  apnea-hypopnea index of 82 whereas prone position apnea-hypopnea index  was 51.8, left lateral position apnea-hypopnea index was 144, right  lateral position apnea-hypopnea index was 98.3. PERIODIC LIMB MOVEMENT ANALYSIS:  Revealed the patient did not have any  periodic limb movements. The patient had a total of 53 spontaneous  arousals with a spontaneous arousal index of 8.4. OXYGEN SATURATION MONITORING:  Revealed the patient had a maximum oxygen  desaturation to 82% with a mean oxygen saturation of 93.8%. The patient  spent a total of 9.4 minutes below oxygen saturation less than 88%. EKG MONITORING:  Revealed normal sinus rhythm. IMPRESSION:  1. Severe obstructive sleep apnea with worsening of respiratory events  in supine position. 2.  Decreased and delayed REM sleep limiting the interpretation of the  sleep study. 3.  Nocturnal hypoxia. The patient spent a total of 9.4 minutes below  oxygen saturation less than 88%, most likely due to sleep apnea. 4.  Hypersomnia, by history; however, the [place] Sleepiness Score given  was only 3.    RECOMMENDATIONS:  1. For the patient's sleep-disordered breathing, the patient needs  treatment. 2.  If the patient chooses to go for CPAP titration as a treatment, the  patient should be scheduled for in-lab CPAP titration as soon as  possible. The patient to follow up with Shakila Brown MD's clinic in six  to eight weeks on recommended CPAP therapy for clinical reevaluation  with review of download. 3.  If the patient wishes to discuss her sleep study reports, the  patient should be scheduled for followup with Shakila Brown MD's clinic  as soon as possible. Thanks to Shabbir Hooker MD, for giving me this opportunity to participate  in the care of this pleasant lady.         Eleanor Turner MD    D: 12/20/2022 20:04:43       T: 12/21/2022 14:56:07     [email or web address]  Job#: [id number]     Doc#: [id number]    CC:

## 2022-12-28 ENCOUNTER — APPOINTMENT (OUTPATIENT)
Dept: PHYSICAL THERAPY | Age: 52
End: 2022-12-28
Payer: COMMERCIAL

## 2022-12-28 ENCOUNTER — HOSPITAL ENCOUNTER (OUTPATIENT)
Dept: PHYSICAL THERAPY | Age: 52
Setting detail: THERAPIES SERIES
Discharge: HOME | End: 2022-12-28
Payer: COMMERCIAL

## 2022-12-28 PROCEDURE — 97110 THERAPEUTIC EXERCISES: CPT

## 2022-12-28 PROCEDURE — 97140 MANUAL THERAPY 1/> REGIONS: CPT

## 2022-12-28 NOTE — PROGRESS NOTES
shaded column indicates activity completed today    *\" next to exercise/intervention indicates progression   Modalities Parameters/  Location  Notes                     Manual Therapy Time/Technique  Notes   Gentle traction, suboccipital release, MFR to B upper trap 10 min x                Exercise/  Intervention   Notes   Backward shoulder rolls 15x  X    Scapular retraction 2x10x3 sec  X    Cervical retraction 2x10x3 sec  X    Cervical rotation 10x3 sec  X    Upper trap and levator stretch 3x20 sec UT  3x10 sec Lev  X    Upper thoracic stretch at // bars 3x20 sec      Posterior capsule stretch  3x20 sec  X    Cervical isometrics: flexion, extension, rotation, lateral flexion 5x5 sec  X Lateral flexion triggered tingling at C7-T1          Upper thoracic stretch with yellow stability ball- 3 way* 5x10 sec  x                    Specific Interventions Next Treatment: Modalities as needed for cervical pain and tightness, cervical stretching and strengthening, posture    Activity/Treatment Tolerance:  [x]  Patient tolerated treatment well  []  Patient limited by fatigue  []  Patient limited by pain   []  Patient limited by medical complications  []  Other:     Assessment: Multiple exercises triggered tingling sensation at C7-T2 region. Upper thoracic stretch completed with stability ball today. Ended with manual work to decrease tightness. Authorization date extension requested. GOALS:  Patient Goal: \"I would like to be able to move my neck better, get rid of tingling in neck, and to not have pain when I look up. \"    Short Term Goals:  Time Frame: 4 weeks  Improve NDI to <10% impaired to assist with duties at work. Decrease pain in neck to no more than 5/10 to assist with sitting at work. Improve cervical ROM to 25% loss or less to assist with turning head when driving. Improve posture needing no cues for correction to assist with preventing injury.   Patient able to sit at work for 30 minutes with no increase in pain to assist with daily work duties. Long Term Goals:  Time Frame: 12 weeks  Independent with HEP and with progression to assist with decreasing pain. Patient Education:   []  HEP/Education Completed: Continue HEP, monitor pain and tightness, use of heat/ice  Medbridge Access Code: Karlie Sahara  [x]  No new Education completed  []  Reviewed Prior HEP      []  Patient verbalized and/or demonstrated understanding of education provided. []  Patient unable to verbalize and/or demonstrate understanding of education provided. Will continue education. []  Barriers to learning: none per patient    PLAN:  Treatment Recommendations: Strengthening, Range of Motion, Balance Training, Functional Mobility Training, Manual Therapy - Soft Tissue Mobilization, Pain Management, Home Exercise Program, Patient Education, Safety Education and Training, Self-Care Education and Training, Positioning, Integrative Dry Needling, Aquatics, and Modalities    []  Plan of care initiated. Plan to see patient 2 times per week for up to 12 weeks to address the treatment planned outlined above.   [x]  Continue with current plan of care  []  Modify plan of care as follows:    []  Hold pending physician visit  []  Discharge    Time In 1248   Time Out 1327   Timed Code Minutes: 39 min   Total Treatment Time: 39 min       Electronically Signed by: Eden Catherine, PT

## 2022-12-30 ENCOUNTER — HOSPITAL ENCOUNTER (OUTPATIENT)
Dept: PHYSICAL THERAPY | Age: 52
Setting detail: THERAPIES SERIES
Discharge: HOME | End: 2022-12-30
Payer: COMMERCIAL

## 2022-12-30 PROCEDURE — 97110 THERAPEUTIC EXERCISES: CPT

## 2022-12-30 PROCEDURE — 97140 MANUAL THERAPY 1/> REGIONS: CPT

## 2022-12-30 NOTE — PROGRESS NOTES
[address] [facility]  PHYSICAL THERAPY  [] EVALUATION  [] DAILY NOTE (LAND) [] DAILY NOTE (AQUATIC ) [x] PROGRESS NOTE [] DISCHARGE NOTE    [x] OUTPATIENT REHABILITATION CENTER [facility]   [] [place] [address]    [] [facility]   [] Gini Mueller    Date: 2022  Patient Name:  Marcos Coelho  : 1970  MRN: [id number]  CSN: [id number]    Referring Practitioner Stephen Gomez MD   Diagnosis Encounter for other orthopedic aftercare [Z47.89]  Cervicalgia [M54.2]    Treatment Diagnosis Cervical pain   Date of Evaluation 22    Additional Pertinent History ACDF 2021, Arthritis      Functional Outcome Measure Used Neck Disability Index   Functional Outcome Score 14% impaired (22)       Insurance: Primary: Payor: Christy Jarvis /  /  / ,   Secondary:    Authorization Information: PRE CERTIFICATION REQUIRED: YES, AFTER EVAL THRU ABIGAIL @ [phone]  INSURANCE THERAPY BENEFIT:  20 VISITS PER [facility] 7: YES  MODALITIES COVERED:  YES  -RECEIVED AUTH FOR PT  TOTAL OF 10 VISITS   FROM 22 TO 22  NO SPECIFIC CPT CODES   Visit # 5, 5/11 for progress note   Visits Allowed: 11   Recertification Date: 07   Physician Follow-Up: Does not know of a follow up with doctor   Physician Orders: Eval and treat   History of Present Illness: Patient had surgery on neck in 2021. Patient reports when she sits for a longer period of time  and when she looks up, she has pain. Patient reports at work if she's sitting and starts having pain, she gets up and walks which helps the pain. Reports her work station is pretty ergonomic. SUBJECTIVE: Patient reports she feels like the therapy is helping and she is working on [employer]. Patient continues to have tingling sensation with levator stretch.  Patient states she is trying to complete scapular retractions and shoulder rolls throughout the day when she is at her desk at work and that seems to be helping. TREATMENT   Precautions: ACDF September 2021, Arthritis   Pain: Denies    \"X in shaded column indicates activity completed today    *\" next to exercise/intervention indicates progression   Modalities Parameters/  Location  Notes                     Manual Therapy Time/Technique  Notes   Gentle traction, suboccipital release, MFR to B upper trap 8 minutes X End of session in supine               Exercise/  Intervention   Notes   Backward shoulder rolls 15x  X    Scapular retraction 2x10x5 sec  X    Cervical retraction 2x10x5 sec  X    Cervical rotation 15*x3 sec  X    Upper trap and levator stretch 3x20 sec UT  3x10 sec Lev  X    Upper thoracic stretch at // bars 3x20 sec      Posterior capsule stretch  3x20 sec  X    Cervical isometrics: flexion, extension, rotation, lateral flexion 5x5 sec  X Lateral flexion triggered tingling at C7-T1          Upper thoracic stretch with yellow stability ball- 3 way 5x10 sec  X                    Specific Interventions Next Treatment: Modalities as needed for cervical pain and tightness, cervical stretching and strengthening, posture    Activity/Treatment Tolerance:  [x]  Patient tolerated treatment well  []  Patient limited by fatigue  []  Patient limited by pain   []  Patient limited by medical complications  []  Other:     Assessment:  Continued exercises as noted above. Patient tolerated well but continues to get tingling sensation at lower cervical/upper thoracic region. No complaints of increased pain but musculature continues to be tight so concluded session with manual techniques to assist with decreasing tightness. Pt progressing well toward goals, but has only met 1 goal. Pt would benefit from continued PT to further address goals. GOALS:  Patient Goal: \"I would like to be able to move my neck better, get rid of tingling in neck, and to not have pain when I look up. \"    Short Term Goals:  Time Frame: 4 weeks  Improve NDI to <10% impaired to assist with duties at work. NOT MET  12% neck disability score  Decrease pain in neck to no more than 5/10 to assist with sitting at work. NOT MET Pain increases during work   Improve cervical ROM to 25% loss or less to assist with turning head when driving. NOT MET Patient needs to turn trunk to look over Left shoulder  Improve posture needing no cues for correction to assist with preventing injury. NOT MET Improvement noted with posture but continues to require 1-2 cues throughout session. Patient able to sit at work for 30 minutes with no increase in pain to assist with daily work duties. MET Patient is able to sit at work for about 1 hour without increased pain. Long Term Goals:  Time Frame: 12 weeks  Independent with HEP and with progression to assist with decreasing pain. ONGOING      Patient Education:   []  HEP/Education Completed: Continue HEP, monitor pain and tightness, use of heat/ice  Medbridge Access Code: Jamal Glaser  [x]  No new Education completed  []  Reviewed Prior HEP      []  Patient verbalized and/or demonstrated understanding of education provided. []  Patient unable to verbalize and/or demonstrate understanding of education provided. Will continue education. []  Barriers to learning: none per patient    PLAN:  Treatment Recommendations: Strengthening, Range of Motion, Balance Training, Functional Mobility Training, Manual Therapy - Soft Tissue Mobilization, Pain Management, Home Exercise Program, Patient Education, Safety Education and Training, Self-Care Education and Training, Positioning, Integrative Dry Needling, Aquatics, and Modalities    []  Plan of care initiated. Plan to see patient 2 times per week for up to 12 weeks to address the treatment planned outlined above.   [x]  Continue with current plan of care  []  Modify plan of care as follows:    []  Hold pending physician visit  []  Discharge    Time In 0915   Time Out 1000   Timed Code Minutes: 45 min Total Treatment Time: 45 min       Electronically Signed by: RYAN Rowan DPT, #[id number]

## 2023-01-10 ENCOUNTER — OFFICE VISIT (OUTPATIENT)
Dept: PULMONOLOGY | Age: 53
End: 2023-01-10
Payer: COMMERCIAL

## 2023-01-10 VITALS
SYSTOLIC BLOOD PRESSURE: 132 MMHG | DIASTOLIC BLOOD PRESSURE: 84 MMHG | OXYGEN SATURATION: 97 % | HEIGHT: 62 IN | TEMPERATURE: 98 F | WEIGHT: 206 LBS | HEART RATE: 92 BPM | BODY MASS INDEX: 37.91 KG/M2

## 2023-01-10 DIAGNOSIS — R06.83 SNORING: ICD-10-CM

## 2023-01-10 DIAGNOSIS — G47.19 EXCESSIVE DAYTIME SLEEPINESS: ICD-10-CM

## 2023-01-10 DIAGNOSIS — E66.9 OBESITY (BMI 30-39.9): ICD-10-CM

## 2023-01-10 DIAGNOSIS — G47.33 OBSTRUCTIVE SLEEP APNEA: Primary | ICD-10-CM

## 2023-01-10 PROCEDURE — 99214 OFFICE O/P EST MOD 30 MIN: CPT | Performed by: NURSE PRACTITIONER

## 2023-01-10 ASSESSMENT — ENCOUNTER SYMPTOMS
APNEA: 1
ABDOMINAL PAIN: 0
COUGH: 0
EYES NEGATIVE: 1
SHORTNESS OF BREATH: 0
WHEEZING: 0
NAUSEA: 0
VOMITING: 0
DIARRHEA: 0

## 2023-01-10 NOTE — PROGRESS NOTES
[place] [facility]    Nate Alvarez, 46 y.o.  [id number]      Pt of Dr. Krishna Richmond  Nurses Notes   PSG follow up   Study Results  Initial Study Date -  12/19/2022  AHI -  62.2    TotalEvents - 391  (Apneas  302  Hypopneas 89  Central  27)  LM w/Arousals - 0  Sleep Efficiency - 85.9 % (Total Sleep Time - 377.3 min)  Time with Sats below 88% - 9.4 min  ESS: 1  SAQLI: 83  Interval History       Nate Alvarez is a 46 y.o. old female who comes in to review the results of her recent sleep study, to answer questions and to explore options for treatment. Allergies   Allergen Reactions    Erythromycin     Tetracyclines & Related      Meds  Current Outpatient Medications   Medication Sig Dispense Refill    valACYclovir (VALTREX) 500 MG tablet take 1 tablet by mouth once daily      omeprazole (PRILOSEC) 40 MG delayed release capsule Take 1 capsule by mouth daily 30 capsule 11    Multiple Vitamins-Minerals (WOMENS MULTI) CAPS Take by mouth      Glucos-Chondroit-Collag-Hyal (GLUCOSAMINE CHONDROIT-COLLAGEN PO) Take 1 tablet by mouth       No current facility-administered medications for this visit. ROS  Review of Systems   Constitutional:  Positive for fatigue and unexpected weight change. Negative for activity change, appetite change, chills and fever. HENT: Negative. Eyes: Negative. Respiratory:  Positive for apnea. Negative for cough, shortness of breath and wheezing. Snoring    Cardiovascular:  Negative for chest pain, palpitations and leg swelling. Gastrointestinal:  Negative for abdominal pain, diarrhea, nausea and vomiting. Genitourinary: Negative. Musculoskeletal: Negative. Skin: Negative. Neurological: Negative. Hematological: Negative. Psychiatric/Behavioral:  Positive for sleep disturbance.          Exam  Vitals -  /84   Pulse 92   Temp 98 °F (36.7 °C)   Ht 5' 2\" (1.575 m)   Wt 206 lb (93.4 kg)   LMP 07/28/2019   SpO2 97% Comment: r/a  BMI 37.68 kg/m²    Body mass index is 37.68 kg/m². Oxygen level - Room Air  Physical Exam  Vitals and nursing note reviewed. Constitutional:       Appearance: Normal appearance. She is obese. HENT:      Head: Normocephalic and atraumatic. Mouth/Throat:      Pharynx: Oropharynx is clear. Eyes:      Conjunctiva/sclera: Conjunctivae normal.   Pulmonary:      Effort: Pulmonary effort is normal. No tachypnea, bradypnea or respiratory distress. Skin:     Findings: No erythema or rash. Neurological:      Mental Status: She is alert and oriented to person, place, and time. Psychiatric:         Attention and Perception: Attention normal.         Mood and Affect: Mood normal.         Speech: Speech normal.         Behavior: Behavior normal.         Thought Content: Thought content normal.         Cognition and Memory: Cognition normal.         Judgment: Judgment normal.      Assessment   Diagnosis Orders   1. Obstructive sleep apnea  Sleep Study with PAP Titration      2. Snoring  Sleep Study with PAP Titration      3. Excessive daytime sleepiness  Sleep Study with PAP Titration      4. Obesity (BMI 30-39. 9)  Sleep Study with PAP Titration         Recommendations    I reviewed the sleep study results in detail with patient. We discussed various treatment options including positional therapy, weight loss, OAT and PAP therapies along with the benefits and limitations of each. We also discussed the health risks with untreated [place]. Due to the severity of apnea, oxygen desaturation and symptoms, PAP therapies is recommended. Patient agrees to proceed with PAP titration   Educated on proper sleep hygiene measures. 30 minutes was spent on this visit with > 50% being face to face obtaining HPI, examining patient, reviewing test results, educating and coordinating a treatment plan. Follow up 8 weeks after PAP set up with download.   Electronically signed by GILA Peres - SULEMA on 1/10/2023 at 4:51 PM      [place] [facility]  1/10/2023

## 2023-01-19 ENCOUNTER — HOSPITAL ENCOUNTER (OUTPATIENT)
Dept: PHYSICAL THERAPY | Age: 53
Setting detail: THERAPIES SERIES
Discharge: HOME | End: 2023-01-19
Payer: COMMERCIAL

## 2023-01-19 PROCEDURE — 97110 THERAPEUTIC EXERCISES: CPT

## 2023-01-19 PROCEDURE — 97140 MANUAL THERAPY 1/> REGIONS: CPT

## 2023-01-19 NOTE — PROGRESS NOTES
[address] [facility]  PHYSICAL THERAPY  [] EVALUATION  [] DAILY NOTE (LAND) [] DAILY NOTE (AQUATIC ) [x] PROGRESS NOTE [] DISCHARGE NOTE    [x] OUTPATIENT REHABILITATION CENTER [facility]   [] [place] [address]    [] [facility]   [] Dimitry Nova    Date: 2023  Patient Name:  Sheela Neves  : 1970  MRN: [id number]  CSN: [id number]    Referring Practitioner Amelia Montoya MD   Diagnosis Encounter for other orthopedic aftercare [Z47.89]  Cervicalgia [M54.2]    Treatment Diagnosis Cervical pain   Date of Evaluation 22    Additional Pertinent History ACDF 2021, Arthritis      Functional Outcome Measure Used Neck Disability Index   Functional Outcome Score 14% impaired (22)       Insurance: Primary: Payor: Mario Solomon /  /  / ,   Secondary:    Authorization Information: PRE CERTIFICATION REQUIRED: YES, AFTER EVAL THRU [employer]IGAIL @ [phone]  INSURANCE THERAPY BENEFIT:  20 VISITS PER [facility] 7: YES  MODALITIES COVERED:  YES  -RECEIVED AUTH FOR PT  TOTAL OF 10 VISITS   FROM 22 TO 22  NO SPECIFIC CPT CODES  RECEIVED AUTH FOR AN ADDITIONAL 6 PT VISITS TO GO FROM 1/3/23-23 NO SPECIFIC CODES WERE GIVEN REF# [id number]   Visit # 5,  for progress note   Visits Allowed: 11   Recertification Date:    Physician Follow-Up: Does not know of a follow up with doctor   Physician Orders: Eval and treat   History of Present Illness: Patient had surgery on neck in 2021. Patient reports when she sits for a longer period of time  and when she looks up, she has pain. Patient reports at work if she's sitting and starts having pain, she gets up and walks which helps the pain. Reports her work station is pretty ergonomic. SUBJECTIVE:  Patient states pain is situational and mostly occurs when she is sitting at her desk at work. Reports tingling sensation will come and go intermittently. Patient continues to work on backward shoulder rolls and scap squeezes to help with the pain and tingling at her desk. TREATMENT   Precautions: ACDF September 2021, Arthritis   Pain: Denies    \"X in shaded column indicates activity completed today    *\" next to exercise/intervention indicates progression   Modalities Parameters/  Location  Notes                     Manual Therapy Time/Technique  Notes   Gentle traction, suboccipital release, MFR to B upper trap 8 minutes X End of session in supine               Exercise/  Intervention   Notes   Backward shoulder rolls 2*x15  X    Scapular retraction 2x10x5 sec  X    Cervical retraction 2x10x5 sec  X    Cervical rotation 15x3 sec  X    Upper trap and levator stretch 3x20 sec UT  3x10 sec Lev  X    Upper thoracic stretch at // bars 3x20 sec      Posterior capsule stretch  3x20 sec  X    Cervical isometrics: flexion, extension, rotation, lateral flexion 5x5 sec  X Lateral flexion triggered tingling at C7-T1          Upper thoracic stretch with yellow stability ball- 3 way 5x10 sec  X                    Specific Interventions Next Treatment: Modalities as needed for cervical pain and tightness, cervical stretching and strengthening, posture    Activity/Treatment Tolerance:  [x]  Patient tolerated treatment well  []  Patient limited by fatigue  []  Patient limited by pain   []  Patient limited by medical complications  []  Other:     Assessment:  Patient doing well with the exercises but continues to get tingling sensation, especially with levator stretch. Ended session with manual techniques. Patient with mild to moderate tightness at bilateral cervical and upper traps. Cued patient for proper posture with activities and while seated in chair throughout session. GOALS:  Patient Goal: \"I would like to be able to move my neck better, get rid of tingling in neck, and to not have pain when I look up. \"    Short Term Goals:  Time Frame: 4 weeks  Improve NDI to <10% impaired to assist with duties at work. Decrease pain in neck to no more than 5/10 to assist with sitting at work. Improve cervical ROM to 25% loss or less to assist with turning head when driving. Improve posture needing no cues for correction to assist with preventing injury. Long Term Goals:  Time Frame: 12 weeks  Independent with HEP and with progression to assist with decreasing pain. Patient Education:   []  HEP/Education Completed: Continue HEP, monitor pain and tightness, use of heat/ice  Medbridge Access Code: Julieta Rico  [x]  No new Education completed  []  Reviewed Prior HEP      []  Patient verbalized and/or demonstrated understanding of education provided. []  Patient unable to verbalize and/or demonstrate understanding of education provided. Will continue education. []  Barriers to learning: none per patient    PLAN:  Treatment Recommendations: Strengthening, Range of Motion, Balance Training, Functional Mobility Training, Manual Therapy - Soft Tissue Mobilization, Pain Management, Home Exercise Program, Patient Education, Safety Education and Training, Self-Care Education and Training, Positioning, Integrative Dry Needling, Aquatics, and Modalities    []  Plan of care initiated. Plan to see patient 2 times per week for up to 12 weeks to address the treatment planned outlined above.   [x]  Continue with current plan of care  []  Modify plan of care as follows:    []  Hold pending physician visit  []  Discharge    Time In 1530   Time Out 1610   Timed Code Minutes: 40 min   Total Treatment Time: 40 min       Electronically Signed by: Rossy Fraser PTA

## 2023-01-23 ENCOUNTER — APPOINTMENT (OUTPATIENT)
Dept: PHYSICAL THERAPY | Age: 53
End: 2023-01-23
Payer: COMMERCIAL

## 2023-01-24 ENCOUNTER — HOSPITAL ENCOUNTER (OUTPATIENT)
Dept: PHYSICAL THERAPY | Age: 53
Setting detail: THERAPIES SERIES
Discharge: HOME | End: 2023-01-24
Payer: COMMERCIAL

## 2023-01-24 PROCEDURE — 97110 THERAPEUTIC EXERCISES: CPT

## 2023-01-24 PROCEDURE — 97140 MANUAL THERAPY 1/> REGIONS: CPT

## 2023-01-24 NOTE — PROGRESS NOTES
[address] [facility]  PHYSICAL THERAPY  [] EVALUATION  [x] DAILY NOTE (LAND) [] DAILY NOTE (AQUATIC ) [] PROGRESS NOTE [] DISCHARGE NOTE    [x] OUTPATIENT REHABILITATION [facility]   [] [place] [address]    [] [facility]   [] Francia Hammer    Date: 2023  Patient Name:  Hector Mcwilliams  : 1970  MRN: [id number]  CSN: [id number]    Referring Practitioner Saray Zafar MD   Diagnosis Encounter for other orthopedic aftercare [Z47.89]  Cervicalgia [M54.2]    Treatment Diagnosis Cervical pain   Date of Evaluation 22    Additional Pertinent History ACDF 2021, Arthritis      Functional Outcome Measure Used Neck Disability Index   Functional Outcome Score 14% impaired (22)       Insurance: Primary: Payor: Bonnie Gao /  /  / ,   Secondary:    Authorization Information: PRE CERTIFICATION REQUIRED: YES, AFTER EVAL THRU ABIGAIL @ [phone]  INSURANCE THERAPY BENEFIT:  20 VISITS PER [facility] 7: YES  MODALITIES COVERED:  YES  -RECEIVED AUTH FOR PT  TOTAL OF 10 VISITS   FROM 22 TO 22  NO SPECIFIC CPT CODES  RECEIVED AUTH FOR AN ADDITIONAL 6 PT VISITS TO GO FROM 1/3/23-23 NO SPECIFIC CODES WERE GIVEN REF# [id number]   Visit # 6,  for progress note   Visits Allowed: 11   Recertification Date:    Physician Follow-Up: Does not know of a follow up with doctor   Physician Orders: Eval and treat   History of Present Illness: Patient had surgery on neck in 2021. Patient reports when she sits for a longer period of time  and when she looks up, she has pain. Patient reports at work if she's sitting and starts having pain, she gets up and walks which helps the pain. Reports her work station is pretty ergonomic. SUBJECTIVE:  Patient states she has adjusted things at work and she is feeling better than she was.       TREATMENT   Precautions: ACDF 2021, Arthritis   Pain: Denies    \"X in shaded column indicates activity completed today    *\" next to exercise/intervention indicates progression   Modalities Parameters/  Location  Notes                     Manual Therapy Time/Technique  Notes   Gentle traction, suboccipital release, MFR to B upper trap 8 minutes X End of session in supine               Exercise/  Intervention   Notes   Backward shoulder rolls 2x15  X    Scapular retraction 2x10x5 sec  X    Cervical retraction 2x10x5 sec  X    Cervical rotation 10x3 sec  X    Upper trap and levator stretch 3x20 sec UT  3x10 sec Lev  X    Upper thoracic stretch at // bars 3x20 sec      Posterior capsule stretch  3x20 sec  X    Cervical isometrics: flexion, extension, rotation, lateral flexion 5x5 sec  X Lateral flexion triggered tingling at C7-T1          Upper thoracic stretch with yellow stability ball- 3 way 5x10 sec  X                    Specific Interventions Next Treatment: Modalities as needed for cervical pain and tightness, cervical stretching and strengthening, posture    Activity/Treatment Tolerance:  [x]  Patient tolerated treatment well  []  Patient limited by fatigue  []  Patient limited by pain   []  Patient limited by medical complications  []  Other:     Assessment:  Patient not reporting any symptoms today. Does get a tingling sensation in back at times. Feels better after manual therapy. GOALS:  Patient Goal: \"I would like to be able to move my neck better, get rid of tingling in neck, and to not have pain when I look up. \"    Short Term Goals:  Time Frame: 4 weeks  Improve NDI to <10% impaired to assist with duties at work. Decrease pain in neck to no more than 5/10 to assist with sitting at work. Improve cervical ROM to 25% loss or less to assist with turning head when driving. Improve posture needing no cues for correction to assist with preventing injury.        Long Term Goals:  Time Frame: 12 weeks  Independent with HEP and with progression to assist with decreasing pain. Patient Education:   []  HEP/Education Completed: Continue HEP, monitor pain and tightness, use of heat/ice  Medbridge Access Code: García Jeffers  [x]  No new Education completed  []  Reviewed Prior HEP      []  Patient verbalized and/or demonstrated understanding of education provided. []  Patient unable to verbalize and/or demonstrate understanding of education provided. Will continue education. []  Barriers to learning: none per patient    PLAN:  Treatment Recommendations: Strengthening, Range of Motion, Balance Training, Functional Mobility Training, Manual Therapy - Soft Tissue Mobilization, Pain Management, Home Exercise Program, Patient Education, Safety Education and Training, Self-Care Education and Training, Positioning, Integrative Dry Needling, Aquatics, and Modalities    []  Plan of care initiated. Plan to see patient 2 times per week for up to 12 weeks to address the treatment planned outlined above.   [x]  Continue with current plan of care  []  Modify plan of care as follows:    []  Hold pending physician visit  []  Discharge    Time In 0900   Time Out 0940   Timed Code Minutes: 40 min   Total Treatment Time: 40 min       Electronically Signed by: Magdy Jordan, PT

## 2023-01-25 ENCOUNTER — HOSPITAL ENCOUNTER (OUTPATIENT)
Dept: SLEEP CENTER | Age: 53
Discharge: HOME | End: 2023-01-27
Payer: COMMERCIAL

## 2023-01-25 DIAGNOSIS — G47.19 EXCESSIVE DAYTIME SLEEPINESS: ICD-10-CM

## 2023-01-25 DIAGNOSIS — E66.9 OBESITY (BMI 30-39.9): ICD-10-CM

## 2023-01-25 DIAGNOSIS — R06.83 SNORING: ICD-10-CM

## 2023-01-25 DIAGNOSIS — G47.33 OBSTRUCTIVE SLEEP APNEA: ICD-10-CM

## 2023-01-25 PROCEDURE — 95811 POLYSOM 6/>YRS CPAP 4/> PARM: CPT

## 2023-01-26 DIAGNOSIS — G47.19 EXCESSIVE DAYTIME SLEEPINESS: ICD-10-CM

## 2023-01-26 DIAGNOSIS — G47.33 OSA TREATED WITH BIPAP: Primary | ICD-10-CM

## 2023-01-26 LAB — STATUS: NORMAL

## 2023-01-27 ENCOUNTER — HOSPITAL ENCOUNTER (OUTPATIENT)
Dept: PHYSICAL THERAPY | Age: 53
Setting detail: THERAPIES SERIES
Discharge: HOME | End: 2023-01-27
Payer: COMMERCIAL

## 2023-01-27 PROCEDURE — 97110 THERAPEUTIC EXERCISES: CPT

## 2023-01-27 NOTE — PROGRESS NOTES
[address] [place] [address]                               SLEEP STUDY REPORT    PATIENT NAME: Abida Finnegan               :        1970  MED REC NO:   [id number]                           ROOM:  ACCOUNT NO:   [de-identified]                           ADMIT DATE: 2023  PROVIDER:     Carmen Mosher. MD Marielena    DATE OF STUDY:  2023    CPAP/BIPAP TITRATION STUDY REPORT    The patient's height is 62 inches, weight is 206 pounds with a BMI of  37.7. HISTORY:  The patient is a 51-year-old female underwent a initial  baseline sleep study on 2022. The patient was diagnosed with  severe obstructive sleep apnea with worsening of respiratory events in  supine position. The patient had associated comorbidities including  hypersomnia and nocturnal hypoxia. The patient is scheduled for  CPAP/BiPAP titration by Lazarus Gasmen, CNP. The patient was followed by  Lazarus Gasmen, CNP, at [facility] on 01/10/2023. The  patient used to follow with Gabino Vyas MD, in the past.    METHODS:  The patient underwent digital polysomnography in compliance  with the standards and specifications from the AASM Manual including the  simultaneous recording of 3 EEG channels (F4-M1, C4-M1, and O2-M1 with  back up electrodes F3-M2, C3-M2, and O1-M2), 2 EOG channels (E1-M2, and  E2-M1,), EMG (chin, left & right leg), EKG, Nonin pulse oximetry with   less than 2 second averaging time, body position, airflow recorded by  oral-nasal thermal sensor and nasal air pressure transducer, plus  respiratory effort recorded by calibrated respiratory inductance  plethysmography (RIP), flow volume loop, sound and video. Sleep staging  and scoring followed the standard put forth by the American Academy of  Sleep Medicine and utilized the 1B obstructive hypopnea event  desaturation of 4 percent or greater.     INTERPRETATION:  This is a CPAP/BiPAP titration study. The study was  performed on 01/25/2023. The study was started at 10:04 p.m. and was  terminated at 05:00 a.m. with a total recording time of 416 minutes,  sleep period time was 413.9 minutes, total sleep time was 310 minutes,  and overall sleep efficiency was 74.5% of total sleep time. The sleep  onset latency was 2.1 minutes, wake after sleep onset was 103.9 minutes,  and REM sleep latency was not available due to absent REM sleep. SLEEP STAGING AND DISTRIBUTION SUMMARY:  Revealed the patient spent 197  minutes in stage I consisting of 63.5% of total sleep time, 113 minutes  in stage II consisting of 36.5%. The patient had absent slow-wave sleep  and also REM sleep. CPAP/BIPAP TITRATION STUDY:  The CPAP/BiPAP titration study was started  with a CPAP pressure of 5 cm of water, and the CPAP pressure was  gradually increased to a CPAP pressure of 15 cm of water by titrating to  apneas and hypopneas. At a CPAP pressure of 15 cm of water, the patient  spent 1 minute 22 seconds in bed. The patient did not have any  hypopneas; however, the patient still found to have persistence of  hypoxia. Due to failed CPAP therapy and difficulty with exhalation, the  CPAP mode was changed to BiPAP pressures with a starting BiPAP pressure  of 15/11 cm of water. The BiPAP pressures were further increased to a  final BiPAP pressure of 22/18 cm of water. At a final BiPAP pressure of  22/18 cm of water, the patient spent 3 minutes 34 seconds in bed. Out  of 3 minutes 34 seconds, the patient slept for a period of 2 minutes 39  seconds in non-REM sleep. The patient did not achieve REM sleep during  the entire titration study. At a BiPAP pressure of 22/18 cm of water,  the patient had a total of 1 obstructive hypopnea event with an  apnea-hypopnea index of 22.6.   The maximum oxygen desaturation recorded  at this pressure was 84%; however, towards the end of the study, the  patient's oxygen saturation remained between 92% to 98% with a mean  oxygen saturation of 93.5%. This titration was performed on room air. PERIODIC LIMB MOVEMENT ANALYSIS:  Revealed the patient had a total of 0  periodic limb movements. The patient had a total of 91 spontaneous  arousals with a spontaneous arousal index of 17.6. EKG MONITORING:  Revealed normal sinus rhythm; however, at times the EKG  rhythm signal got lost limiting the interpretation of EKG result. During the titration study, the patient had a total of 152 apneas and  hypopneas. Out of 152, only 37 were central in nature. Majority of the  events were obstructive in nature. The patient did not qualify for the  definition of complex sleep apnea. IMPRESSION:  1. Severe obstructive sleep apnea with worsening of respiratory events  in supine position. The patient failed CPAP followed by BiPAP pressures  up to a final BiPAP pressure of 22/18 cm of water. The patient is still  left with significant uncontrolled respiratory events; however, the  patient's oxygen saturation got better and at a final BiPAP pressures,  the patient's oxygen saturation remained between 92% to 98% on room air. 2.  Absent REM sleep limiting the interpretation of the sleep study. 3.  Absent slow wave sleep. 4.  Nocturnal hypoxia noted during the baseline sleep study improved  with BiPAP therapy. 5.  Hypersomnia. RECOMMENDATIONS:  1. For the patient's sleep-disordered breathing, we recommend starting  therapy with auto BiPAP with minimum EPAP of 18 cm of water and maximum  IPAP of 25 cm of water with pressure support of 6 cm of water with room  air. 2.  Specific recommendations include, the patient's choice of interface  was medium size Ruthie view mask. 3.  The patient should be scheduled for followup with Phyllistine Bloch, CNP, clinic in six to eight weeks on recommended BiPAP therapy for a  clinical reevaluation with review of download.     Thanks to Phyllistine Bloch, CNP, for giving me this opportunity to  participate in the care of this pleasant lady.         Fidencio Valdez MD    D: 01/26/2023 17:46:26       T: 01/27/2023 12:30:14     [email or web address]  Job#: [id number]     Doc#: [id number]    CC:

## 2023-01-27 NOTE — PROGRESS NOTES
[address] [facility]  PHYSICAL THERAPY  [] EVALUATION  [x] DAILY NOTE (LAND) [] DAILY NOTE (AQUATIC ) [] PROGRESS NOTE [] DISCHARGE NOTE    [x] OUTPATIENT REHABILITATION [facility]   [] [place] [address]    [] [facility]   [] Kala Erps    Date: 2023  Patient Name:  Jimmy Armando  : 1970  MRN: [id number]  CSN: [id number]    Referring Practitioner Gerber Canales MD   Diagnosis Encounter for other orthopedic aftercare [Z47.89]  Cervicalgia [M54.2]    Treatment Diagnosis Cervical pain   Date of Evaluation 22    Additional Pertinent History ACDF 2021, Arthritis      Functional Outcome Measure Used Neck Disability Index   Functional Outcome Score 14% impaired (22)       Insurance: Primary: Payor: Bear Duran /  /  / ,   Secondary:    Authorization Information: PRE CERTIFICATION REQUIRED: YES, AFTER EVAL THRU [employer]AIL @ [phone]  INSURANCE THERAPY BENEFIT:  20 VISITS PER [place] 7: YES  MODALITIES COVERED:  YES  -RECEIVED AUTH FOR PT  TOTAL OF 10 VISITS   FROM 22 TO 22  NO SPECIFIC CPT CODES  RECEIVED AUTH FOR AN ADDITIONAL 6 PT VISITS TO GO FROM 1/3/23-23 NO SPECIFIC CODES WERE GIVEN REF# [id number]   Visit # 7,  for progress note   Visits Allowed: 11   Recertification Date:    Physician Follow-Up: Does not know of a follow up with doctor   Physician Orders: Eval and treat   History of Present Illness: Patient had surgery on neck in 2021. Patient reports when she sits for a longer period of time  and when she looks up, she has pain. Patient reports at work if she's sitting and starts having pain, she gets up and walks which helps the pain. Reports her work station is pretty ergonomic. SUBJECTIVE:  Patient states that she is having some lower back pain that has started since having her sleep study Wednesday night.  Reports the neck is feeling good today.     TREATMENT   Precautions: ACDF September 2021, Arthritis   Pain: Denies cervical;  7/10 lower back    \"X in shaded column indicates activity completed today    *\" next to exercise/intervention indicates progression   Modalities Parameters/  Location  Notes                     Manual Therapy Time/Technique  Notes   Gentle traction, suboccipital release, MFR to B upper trap 8 minutes  End of session in supine               Exercise/  Intervention   Notes   Backward shoulder rolls 2x15  X    Scapular retraction 2x10x5 sec  X    Cervical retraction 2x10x5 sec  X    Cervical rotation 10x3 sec  X    Upper trap and levator stretch 3x20 sec UT  3x10 sec Lev  X    Upper thoracic stretch at // bars 3x20 sec      Posterior capsule stretch  3x20 sec  X    Cervical isometrics: flexion, extension, rotation, lateral flexion 5x5 sec  X Lateral flexion triggered tingling at C7-T1   Resisted rows, extension, ER, horizontal abduction* 10x Orange X Cueing to relax upper traps          Upper thoracic stretch with yellow stability ball- 3 way 5x10 sec  X Only completed 3 reps today                   Specific Interventions Next Treatment: Modalities as needed for cervical pain and tightness, cervical stretching and strengthening, posture    Activity/Treatment Tolerance:  [x]  Patient tolerated treatment well  []  Patient limited by fatigue  []  Patient limited by pain   []  Patient limited by medical complications  []  Other:     Assessment:  Patient needing rest breaks between exercises this date due to mid and lower back pain that is more recent. Patient denies cervical pain and tingling during session so manual techniques were held. Progressed strengthening with theraband exercises. GOALS:  Patient Goal: \"I would like to be able to move my neck better, get rid of tingling in neck, and to not have pain when I look up. \"    Short Term Goals:  Time Frame: 4 weeks  Improve NDI to <10% impaired to assist with duties at work. Decrease pain in neck to no more than 5/10 to assist with sitting at work. Improve cervical ROM to 25% loss or less to assist with turning head when driving. Improve posture needing no cues for correction to assist with preventing injury. Long Term Goals:  Time Frame: 12 weeks  Independent with HEP and with progression to assist with decreasing pain. Patient Education:   [x]  HEP/Education Completed: Continue HEP, monitor pain and tightness, use of heat/ice  Medbridge Access Code: Oris Fleeting  []  No new Education completed  [x]  Reviewed Prior HEP      []  Patient verbalized and/or demonstrated understanding of education provided. []  Patient unable to verbalize and/or demonstrate understanding of education provided. Will continue education. []  Barriers to learning: none per patient    PLAN:  Treatment Recommendations: Strengthening, Range of Motion, Balance Training, Functional Mobility Training, Manual Therapy - Soft Tissue Mobilization, Pain Management, Home Exercise Program, Patient Education, Safety Education and Training, Self-Care Education and Training, Positioning, Integrative Dry Needling, Aquatics, and Modalities    []  Plan of care initiated. Plan to see patient 2 times per week for up to 12 weeks to address the treatment planned outlined above.   [x]  Continue with current plan of care  []  Modify plan of care as follows:    []  Hold pending physician visit  []  Discharge    Time In 1130   Time Out 1208   Timed Code Minutes: 38 min   Total Treatment Time: 38 min       Electronically Signed by: Ambreen Colin PTA

## 2023-01-30 ENCOUNTER — HOSPITAL ENCOUNTER (OUTPATIENT)
Dept: PHYSICAL THERAPY | Age: 53
Setting detail: THERAPIES SERIES
Discharge: HOME | End: 2023-01-30
Payer: COMMERCIAL

## 2023-01-30 ENCOUNTER — TELEPHONE (OUTPATIENT)
Dept: SLEEP CENTER | Age: 53
End: 2023-01-30

## 2023-01-30 PROCEDURE — 97110 THERAPEUTIC EXERCISES: CPT

## 2023-01-30 NOTE — PROGRESS NOTES
[facility]  PHYSICAL THERAPY  [] EVALUATION  [x] DAILY NOTE (LAND) [] DAILY NOTE (AQUATIC ) [] PROGRESS NOTE [] DISCHARGE NOTE    [x] OUTPATIENT REHABILITATION [facility]   [] [facility]    [] [facility]   [] Oz Madsen    Date: 2023  Patient Name:  Adrian Lezama  : 1970  MRN: [id number]  CSN: [id number]    Referring Practitioner Flor Andrade MD   Diagnosis Encounter for other orthopedic aftercare [Z47.89]  Cervicalgia [M54.2]    Treatment Diagnosis Cervical pain   Date of Evaluation 22    Additional Pertinent History ACDF 2021, Arthritis      Functional Outcome Measure Used Neck Disability Index   Functional Outcome Score 14% impaired (22)       Insurance: Primary: Payor: Megan Ordonez /  /  / ,   Secondary:    Authorization Information: PRE CERTIFICATION REQUIRED: YES, AFTER EVAL THRU ABIGAIL @ [phone]  INSURANCE THERAPY BENEFIT:  20 VISITS PER [facility] 7: YES  MODALITIES COVERED:  YES  -RECEIVED AUTH FOR PT  TOTAL OF 10 VISITS   FROM 22 TO 22  NO SPECIFIC CPT CODES  RECEIVED AUTH FOR AN ADDITIONAL 6 PT VISITS TO GO FROM 1/3/23-23 NO SPECIFIC CODES WERE GIVEN REF# [id number]   Visit # 8,  for progress note   Visits Allowed: 11   Recertification Date:    Physician Follow-Up: Does not know of a follow up with doctor   Physician Orders: Eval and treat   History of Present Illness: Patient had surgery on neck in 2021. Patient reports when she sits for a longer period of time  and when she looks up, she has pain. Patient reports at work if she's sitting and starts having pain, she gets up and walks which helps the pain. Reports her work station is pretty ergonomic. SUBJECTIVE:  Patient states she is doing well. Lower back pain is better but has switched from the Right side to the Left.  Patient reports she was a little sore after last therapy session but thought that was related more to the lower back issues. TREATMENT   Precautions: ACDF September 2021, Arthritis   Pain: Denies cervical;  4/10 lower back    \"X in shaded column indicates activity completed today    *\" next to exercise/intervention indicates progression   Modalities Parameters/  Location  Notes                     Manual Therapy Time/Technique  Notes   Gentle traction, suboccipital release, MFR to B upper trap 8 minutes  End of session in supine               Exercise/  Intervention   Notes   Backward shoulder rolls 2x15  X    Scapular retraction 2x12*x5 sec  X    Cervical retraction 2x12*x5 sec  X    Cervical rotation 12*x3 sec  X    Upper trap and levator stretch 3x20 sec UT  3x10 sec Lev  X    Upper thoracic stretch at // bars 3x20 sec      Posterior capsule stretch  3x20 sec  X    Cervical isometrics: flexion, extension, rotation, lateral flexion 5x5 sec  X Lateral flexion triggered tingling at C7-T1   Resisted rows, extension, ER, horizontal abduction 12*x Orange X Cueing to relax upper traps          Upper thoracic stretch with yellow stability ball- 3 way 5x10 sec  X    Hydrostick forward/back, side to side* 10x  X             Specific Interventions Next Treatment: Modalities as needed for cervical pain and tightness, cervical stretching and strengthening, posture    Activity/Treatment Tolerance:  [x]  Patient tolerated treatment well  []  Patient limited by fatigue  []  Patient limited by pain   []  Patient limited by medical complications  []  Other:     Assessment:  No complaints of pain or production of tingling during session today. Patient felt good at end of session and without pain. Able to progress strengthening. GOALS:  Patient Goal: \"I would like to be able to move my neck better, get rid of tingling in neck, and to not have pain when I look up. \"    Short Term Goals:  Time Frame: 4 weeks  Improve NDI to <10% impaired to assist with duties at work. Decrease pain in neck to no more than 5/10 to assist with sitting at work. Improve cervical ROM to 25% loss or less to assist with turning head when driving. Improve posture needing no cues for correction to assist with preventing injury. Long Term Goals:  Time Frame: 12 weeks  Independent with HEP and with progression to assist with decreasing pain. Patient Education:   [x]  HEP/Education Completed: Continue HEP, monitor pain and tightness, use of heat/ice  Medbridge Access Code: Pat Danger  []  No new Education completed  [x]  Reviewed Prior HEP      []  Patient verbalized and/or demonstrated understanding of education provided. []  Patient unable to verbalize and/or demonstrate understanding of education provided. Will continue education. []  Barriers to learning: none per patient    PLAN:  Treatment Recommendations: Strengthening, Range of Motion, Balance Training, Functional Mobility Training, Manual Therapy - Soft Tissue Mobilization, Pain Management, Home Exercise Program, Patient Education, Safety Education and Training, Self-Care Education and Training, Positioning, Integrative Dry Needling, Aquatics, and Modalities    []  Plan of care initiated. Plan to see patient 2 times per week for up to 12 weeks to address the treatment planned outlined above.   [x]  Continue with current plan of care  []  Modify plan of care as follows:    []  Hold pending physician visit  []  Discharge    Time In 1445   Time Out 1526   Timed Code Minutes: 44 min   Total Treatment Time: 44 min       Electronically Signed by: Ann Whittington PTA

## 2023-02-02 ENCOUNTER — HOSPITAL ENCOUNTER (OUTPATIENT)
Dept: PHYSICAL THERAPY | Age: 53
Setting detail: THERAPIES SERIES
Discharge: HOME | End: 2023-02-02
Payer: COMMERCIAL

## 2023-02-02 PROCEDURE — 97110 THERAPEUTIC EXERCISES: CPT

## 2023-02-02 NOTE — PROGRESS NOTES
[address] [facility]  PHYSICAL THERAPY  [] EVALUATION  [x] DAILY NOTE (LAND) [] DAILY NOTE (AQUATIC ) [] PROGRESS NOTE [] DISCHARGE NOTE    [x] OUTPATIENT REHABILITATION [facility]   [] [place] [address]    [] [facility]   [] Duane Show    Date: 2023  Patient Name:  Quinn Boss  : 1970  MRN: [id number]  CSN: [id number]    Referring Practitioner Martinez Cisneros MD   Diagnosis Encounter for other orthopedic aftercare [Z47.89]  Cervicalgia [M54.2]    Treatment Diagnosis Cervical pain   Date of Evaluation 22    Additional Pertinent History ACDF 2021, Arthritis      Functional Outcome Measure Used Neck Disability Index   Functional Outcome Score 14% impaired (22)       Insurance: Primary: Payor: Harshal Montes /  /  / ,   Secondary:    Authorization Information: PRE CERTIFICATION REQUIRED: YES, AFTER EVAL THRU [employer]IGAIL @ [phone]  INSURANCE THERAPY BENEFIT:  20 VISITS PER [facility] 7: YES  MODALITIES COVERED:  YES  -RECEIVED AUTH FOR PT  TOTAL OF 10 VISITS   FROM 22 TO 22  NO SPECIFIC CPT CODES  RECEIVED AUTH FOR AN ADDITIONAL 6 PT VISITS TO GO FROM 1/3/23-23 NO SPECIFIC CODES WERE GIVEN REF# [id number]   Visit # 9,  for progress note   Visits Allowed: 11   Recertification Date:    Physician Follow-Up: Does not know of a follow up with doctor   Physician Orders: Eval and treat   History of Present Illness: Patient had surgery on neck in 2021. Patient reports when she sits for a longer period of time  and when she looks up, she has pain. Patient reports at work if she's sitting and starts having pain, she gets up and walks which helps the pain. Reports her work station is pretty ergonomic. SUBJECTIVE:  Patient reports she is feeling good and feels like she is getting a lot of of the therapy sessions.     TREATMENT   Precautions: ACDF 2021, Arthritis   Pain: Denies cervical;  /10 lower back    \"X in shaded column indicates activity completed today    *\" next to exercise/intervention indicates progression   Modalities Parameters/  Location  Notes                     Manual Therapy Time/Technique  Notes   Gentle traction, suboccipital release, MFR to B upper trap 8 minutes  End of session in supine               Exercise/  Intervention   Notes   Backward shoulder rolls 2x15  X    Scapular retraction 2x15*x5 sec  X    Cervical retraction 2x15*x2 sec  X    Cervical rotation 15x3 sec  X    Upper trap and levator stretch 3x20 sec UT  3x10 sec Lev  X    Upper thoracic stretch  3x20 sec  X    Posterior capsule stretch  3x20 sec  X    Cervical isometrics: flexion, extension, rotation, lateral flexion 5x5 sec  X Lateral flexion triggered tingling at C7-T1   Resisted rows, extension, ER, horizontal abduction 15x Orange X Cueing to relax upper traps          Upper thoracic stretch with yellow stability ball- 3 way 5x10 sec  X    Hydrostick forward/back, side to side 10x  X             Specific Interventions Next Treatment: Modalities as needed for cervical pain and tightness, cervical stretching and strengthening, posture    Activity/Treatment Tolerance:  [x]  Patient tolerated treatment well  []  Patient limited by fatigue  []  Patient limited by pain   []  Patient limited by medical complications  []  Other:     Assessment:  Patient reports slight tingling in neck today with isometrics. Cues for posture with hydrostick. GOALS:  Patient Goal: \"I would like to be able to move my neck better, get rid of tingling in neck, and to not have pain when I look up. \"    Short Term Goals:  Time Frame: 4 weeks  Improve NDI to <10% impaired to assist with duties at work. Decrease pain in neck to no more than 5/10 to assist with sitting at work. Improve cervical ROM to 25% loss or less to assist with turning head when driving.    Improve posture needing no cues for correction to assist with preventing injury. Long Term Goals:  Time Frame: 12 weeks  Independent with HEP and with progression to assist with decreasing pain. Patient Education:   []  HEP/Education Completed: Continue HEP, monitor pain and tightness, use of heat/ice  Medbridge Access Code: César Ashley  []  No new Education completed  [x]  Reviewed Prior HEP      []  Patient verbalized and/or demonstrated understanding of education provided. []  Patient unable to verbalize and/or demonstrate understanding of education provided. Will continue education. []  Barriers to learning: none per patient    PLAN:  Treatment Recommendations: Strengthening, Range of Motion, Balance Training, Functional Mobility Training, Manual Therapy - Soft Tissue Mobilization, Pain Management, Home Exercise Program, Patient Education, Safety Education and Training, Self-Care Education and Training, Positioning, Integrative Dry Needling, Aquatics, and Modalities    []  Plan of care initiated. Plan to see patient 2 times per week for up to 12 weeks to address the treatment planned outlined above.   [x]  Continue with current plan of care  []  Modify plan of care as follows:    []  Hold pending physician visit  []  Discharge    Time In 1145   Time Out 1225   Timed Code Minutes: 40 min   Total Treatment Time: 40 min       Electronically Signed by: Ab Vigil PT

## 2023-02-06 ENCOUNTER — HOSPITAL ENCOUNTER (OUTPATIENT)
Dept: PHYSICAL THERAPY | Age: 53
Setting detail: THERAPIES SERIES
Discharge: HOME | End: 2023-02-06
Payer: COMMERCIAL

## 2023-02-06 LAB
ABSOLUTE BASO #: 0.05 K/UL (ref 0–0.2)
ABSOLUTE EOS #: 0.15 K/UL (ref 0–0.5)
ABSOLUTE LYMPH #: 1.55 K/UL (ref 1–4)
ABSOLUTE MONO #: 0.41 K/UL (ref 0.2–1)
ABSOLUTE NEUT #: 1.88 K/UL (ref 1.5–7.5)
BASOPHILS RELATIVE PERCENT: 1.2 %
EOSINOPHILS RELATIVE PERCENT: 3.7 %
HCT VFR BLD CALC: 44.2 % (ref 34–45)
HEMOGLOBIN: 14.8 G/DL (ref 11.5–15.5)
HEPATITIS C ANTIBODY: NORMAL
LYMPHOCYTE %: 38.4 %
MCH RBC QN AUTO: 34.3 PG (ref 25–33)
MCHC RBC AUTO-ENTMCNC: 33.5 G/DL (ref 31–36)
MCV RBC AUTO: 102.3 FL (ref 80–99)
MONOCYTES # BLD: 10.1 %
NEUTROPHILS RELATIVE PERCENT: 46.6 %
NRBC ABSOLUTE: 0
NUCLEATED RED BLOOD CELLS: 0 /100 WBC'S
PDW BLD-RTO: 12.2 % (ref 11.5–15)
PLATELETS: 306 K/UL (ref 130–400)
PMV BLD AUTO: 9.6 FL (ref 9.3–13)
RBC: 4.32 M/UL (ref 3.8–5.4)
WBC: 4 K/UL (ref 3.5–11)

## 2023-02-06 PROCEDURE — 97110 THERAPEUTIC EXERCISES: CPT

## 2023-02-06 NOTE — DISCHARGE SUMMARY
[address] [facility]  PHYSICAL THERAPY  [] EVALUATION  [] DAILY NOTE (LAND) [] DAILY NOTE (AQUATIC ) [] PROGRESS NOTE [x] DISCHARGE NOTE    [x] OUTPATIENT REHABILITATION [facility]   [] [place] [address]    [] [facility]   [] Garett Tavarez    Date: 2023  Patient Name:  Roxana Moreno  : 1970  MRN: [id number]  CSN: [id number]    Referring Practitioner Siobhan Harkins MD   Diagnosis Encounter for other orthopedic aftercare [Z47.89]  Cervicalgia [M54.2]    Treatment Diagnosis Cervical pain   Date of Evaluation 22    Additional Pertinent History ACDF 2021, Arthritis      Functional Outcome Measure Used Neck Disability Index   Functional Outcome Score 14% impaired (22) 8% (23)      Insurance: Primary: Payor: Lul Tabares /  /  / ,   Secondary:    Authorization Information: PRE CERTIFICATION REQUIRED: YES, AFTER EVAL THRU [employer]IGAIL @ [phone]  INSURANCE THERAPY BENEFIT:  20 VISITS PER [facility] 7: YES  MODALITIES COVERED:  YES  -RECEIVED AUTH FOR PT  TOTAL OF 10 VISITS   FROM 22 TO 22  NO SPECIFIC CPT CODES  RECEIVED AUTH FOR AN ADDITIONAL 6 PT VISITS TO GO FROM 1/3/23-23 NO SPECIFIC CODES WERE GIVEN REF# [id number]   Visit # 10,  for progress note   Visits Allowed: 11 + 6   Recertification Date:    Physician Follow-Up: Does not know of a follow up with doctor   Physician Orders: Eval and treat   History of Present Illness: Patient had surgery on neck in 2021. Patient reports when she sits for a longer period of time  and when she looks up, she has pain. Patient reports at work if she's sitting and starts having pain, she gets up and walks which helps the pain. Reports her work station is pretty ergonomic. SUBJECTIVE:  Patient reports neck and upper back have been feeling good. Pt tolerating work better, as she makes it a point to stop and do exercises.  Pt tends to forget to do isometrics, as they do cause tingling sensation in neck region. TREATMENT   Precautions: ACDF September 2021, Arthritis   Pain: \"Annoyance\", 3/10 neck    \"X in shaded column indicates activity completed today    *\" next to exercise/intervention indicates progression   Modalities Parameters/  Location  Notes                     Manual Therapy Time/Technique  Notes   Gentle traction, suboccipital release, MFR to B upper trap 8 minutes  End of session in supine               Exercise/  Intervention   Notes   Backward shoulder rolls 2x15  X    Scapular retraction 2x15x5 sec  X    Cervical retraction 2x15x2 sec  X    Cervical rotation 15x3 sec  X    Upper trap and levator stretch 3x20 sec UT  3x10 sec Lev  X    Upper thoracic stretch  3x20 sec  X    Posterior capsule stretch  3x20 sec  X    Cervical isometrics: flexion, extension, rotation, lateral flexion 5x5 sec   Lateral flexion triggered tingling at C7-T1   Resisted rows, extension, ER, horizontal abduction 10 green X 15 reps with orange band for rows only, min resistance so increased to green band          Upper thoracic stretch with yellow stability ball- 3 way 5x10 sec  X    Hydrostick forward/back, side to side 10x  X             Specific Interventions Next Treatment: Modalities as needed for cervical pain and tightness, cervical stretching and strengthening, posture    Activity/Treatment Tolerance:  [x]  Patient tolerated treatment well  []  Patient limited by fatigue  []  Patient limited by pain   []  Patient limited by medical complications  []  Other:     Assessment:  Patient demos good progress toward goals, meeting 4/5 goals. Pain is less and controllable with posture and exercises. Pt notes good postural awareness while at work. Neck ROM to left is more limited than right. Plan discharge to independent [facility].      GOALS:  Patient Goal: \"I would like to be able to move my neck better, get rid of tingling in neck, and to not have pain when I look up. \"    Short Term Goals:  Time Frame: 4 weeks  Improve NDI to <10% impaired to assist with duties at work. GOAL MET:  score 8%. Discontinue Goal  Decrease pain in neck to no more than 5/10 to assist with sitting at work. GOAL MET:  Pain increased to 5/10 at worst but pt able to decrease it to 2/10 pretty easily with posture correction and exercises. Discontinue Goal  Improve cervical ROM to 25% loss or less to assist with turning head when driving. GOAL NOT MET: cervical flexion, extension WFL, rotation right WFL, left 30-40% limited, lateral flexion 50% limited. Discontinue Goal  Improve posture needing no cues for correction to assist with preventing injury. GOAL MET:  Pt verbalizes good postural awareness at work. Discontinue Goal      Long Term Goals:  Time Frame: 12 weeks  Independent with HEP and with progression to assist with decreasing pain. GOAL MET:  Pt compliant with HEP daily, and usually does during work day for symptom management. Discontinue Goal      Patient Education:   []  HEP/Education Completed: progress to [facility] Access Code: Ingris Hobsb  []  No new Education completed  [x]  Reviewed Prior HEP      [x]  Patient verbalized and/or demonstrated understanding of education provided. []  Patient unable to verbalize and/or demonstrate understanding of education provided. Will continue education. []  Barriers to learning: none per patient    PLAN:  Treatment Recommendations: Strengthening, Range of Motion, Balance Training, Functional Mobility Training, Manual Therapy - Soft Tissue Mobilization, Pain Management, Home Exercise Program, Patient Education, Safety Education and Training, Self-Care Education and Training, Positioning, Integrative Dry Needling, Aquatics, and Modalities    []  Plan of care initiated. Plan to see patient 2 times per week for up to 12 weeks to address the treatment planned outlined above.   []  Continue with current plan of care  [] Modify plan of care as follows:    []  Hold pending physician visit  [x]  Discharge    Time In 1532   Time Out 1610   Timed Code Minutes: 38 min   Total Treatment Time: 38 min       Electronically Signed by: Nile Ying PT

## 2023-02-07 LAB
ALBUMIN SERPL-MCNC: 4.6 G/DL (ref 3.5–5.2)
ALK PHOSPHATASE: 78 U/L (ref 40–133)
ALT SERPL-CCNC: 25 U/L (ref 5–40)
ANION GAP SERPL CALCULATED.3IONS-SCNC: 14 MEQ/L (ref 7–16)
AST SERPL-CCNC: 34 U/L (ref 9–40)
BILIRUB SERPL-MCNC: 0.7 MG/DL
BILIRUBIN DIRECT: 0.2 MG/DL (ref 0–0.3)
BUN BLDV-MCNC: 6 MG/DL (ref 6–20)
CALCIUM SERPL-MCNC: 9.4 MG/DL (ref 8.5–10.5)
CHLORIDE BLD-SCNC: 98 MEQ/L (ref 95–107)
CHOLESTEROL/HDL RATIO: 2.5 RATIO
CHOLESTEROL: 235 MG/DL
CO2: 27 MEQ/L (ref 19–31)
CREAT SERPL-MCNC: 0.75 MG/DL (ref 0.6–1.3)
EGFR IF NONAFRICAN AMERICAN: 95 ML/MIN/1.73
GLUCOSE: 97 MG/DL (ref 70–99)
HDLC SERPL-MCNC: 93 MG/DL
LDL CHOLESTEROL CALCULATED: 119 MG/DL
LDL/HDL RATIO: 1.3 RATIO
POTASSIUM SERPL-SCNC: 3.9 MEQ/L (ref 3.5–5.4)
SODIUM BLD-SCNC: 139 MEQ/L (ref 133–146)
TOTAL PROTEIN: 7 G/DL (ref 6.1–8.3)
TRIGL SERPL-MCNC: 114 MG/DL
VLDLC SERPL CALC-MCNC: 23 MG/DL

## 2023-02-09 ENCOUNTER — APPOINTMENT (OUTPATIENT)
Dept: PHYSICAL THERAPY | Age: 53
End: 2023-02-09
Payer: COMMERCIAL

## 2023-02-21 ENCOUNTER — HOSPITAL ENCOUNTER (OUTPATIENT)
Dept: WOMENS IMAGING | Age: 53
Discharge: HOME | End: 2023-02-21
Payer: COMMERCIAL

## 2023-02-21 DIAGNOSIS — Z12.31 VISIT FOR SCREENING MAMMOGRAM: ICD-10-CM

## 2023-02-21 PROCEDURE — 77067 SCR MAMMO BI INCL CAD: CPT

## 2023-03-14 ENCOUNTER — TELEPHONE (OUTPATIENT)
Dept: PULMONOLOGY | Age: 53
End: 2023-03-14

## 2023-03-14 NOTE — TELEPHONE ENCOUNTER
Patient called in and stated she had spoken with [facility] regarding some issues she had been having with her machine and that they advised her to call us to adjust pressures. I have scanned in a detailed dl to this encounter. Pt was just set up 3.7.23 and states she is\" not doing well with it. Was sick over the weekend and feels like she has a hard time keeping up with it . It is making her breath too deep and fast and she has a burning sensation in her nose. \" Please advise, thank you !

## 2023-03-20 ENCOUNTER — TELEPHONE (OUTPATIENT)
Dept: PULMONOLOGY | Age: 53
End: 2023-03-20

## 2023-03-20 DIAGNOSIS — G47.33 OBSTRUCTIVE SLEEP APNEA: Primary | ICD-10-CM

## 2023-03-20 NOTE — TELEPHONE ENCOUNTER
Received letter from [facility] pt newly set up on BiPAP after titration . Having trouble sleeping due to high pressures, only averaging 1-2 hours per night. DL and titration reviewed. Order placed to decrease pressure to max IPAP 20, Min EPAP 10, PS 4  . May have to slowly titrate pressure up over weeks to get to optimal pressure.     Order printed and faxed to [facility]

## 2023-04-18 ENCOUNTER — OFFICE VISIT (OUTPATIENT)
Dept: PULMONOLOGY | Age: 53
End: 2023-04-18
Payer: COMMERCIAL

## 2023-04-18 VITALS
DIASTOLIC BLOOD PRESSURE: 88 MMHG | SYSTOLIC BLOOD PRESSURE: 126 MMHG | OXYGEN SATURATION: 98 % | HEIGHT: 62 IN | HEART RATE: 78 BPM | WEIGHT: 213.4 LBS | BODY MASS INDEX: 39.27 KG/M2 | TEMPERATURE: 97.7 F

## 2023-04-18 DIAGNOSIS — G47.33 OBSTRUCTIVE SLEEP APNEA: Primary | ICD-10-CM

## 2023-04-18 DIAGNOSIS — Z78.9 DIFFICULTY WITH BIPAP USE: ICD-10-CM

## 2023-04-18 DIAGNOSIS — E66.9 OBESITY (BMI 30-39.9): ICD-10-CM

## 2023-04-18 PROCEDURE — 99214 OFFICE O/P EST MOD 30 MIN: CPT | Performed by: NURSE PRACTITIONER

## 2023-04-18 ASSESSMENT — ENCOUNTER SYMPTOMS
VOMITING: 0
SHORTNESS OF BREATH: 0
COUGH: 0
EYES NEGATIVE: 1
WHEEZING: 0
ABDOMINAL PAIN: 0
DIARRHEA: 0
NAUSEA: 0

## 2023-04-18 NOTE — PROGRESS NOTES
patient  -will decrease pressure down to set pressure of 12/8  and asked DME to offer V-COM adaptor   -offered anxiety or sleep aid at night due to insomnia issues. Pt declines at this time. - She  advised to keep good compliance with current recommended pressure to get optimal results and clinical improvement  -other options to treat her severe sleep apnea were discussed, UPPP vs. Mandibular advacement   - Recommend 7-9 hours of sleep with PAP  - She was advised to call DME company regarding supplies if needed.   -She call my office for earlier appointment if needed for worsening of sleep symptoms.   - She was counseled on obesity and need for  weight loss  - Tameka Butler was educated about my impression and plan. Patient verbalizesunderstanding. We will see Catrachito Sullivan back in: 2 months with download    Patient verbalizes understanding.   Information added by my medical assistant/LPN was reviewed today    billing based on medical decision making     GILA Angel-CNP   4/18/2023

## 2023-04-20 ENCOUNTER — HOSPITAL ENCOUNTER (OUTPATIENT)
Age: 53
Discharge: HOME | End: 2023-04-20
Payer: COMMERCIAL

## 2023-04-20 PROCEDURE — 93005 ELECTROCARDIOGRAM TRACING: CPT | Performed by: OBSTETRICS & GYNECOLOGY

## 2023-04-21 LAB
EKG ATRIAL RATE: 103 BPM
EKG P AXIS: 62 DEGREES
EKG P-R INTERVAL: 138 MS
EKG Q-T INTERVAL: 330 MS
EKG QRS DURATION: 76 MS
EKG QTC CALCULATION (BAZETT): 432 MS
EKG R AXIS: -11 DEGREES
EKG T AXIS: 8 DEGREES
EKG VENTRICULAR RATE: 103 BPM

## 2023-05-01 ENCOUNTER — OFFICE VISIT (OUTPATIENT)
Dept: CARDIOLOGY CLINIC | Age: 53
End: 2023-05-01
Payer: COMMERCIAL

## 2023-05-01 VITALS
BODY MASS INDEX: 38.64 KG/M2 | WEIGHT: 210 LBS | HEIGHT: 62 IN | DIASTOLIC BLOOD PRESSURE: 83 MMHG | HEART RATE: 85 BPM | SYSTOLIC BLOOD PRESSURE: 130 MMHG

## 2023-05-01 DIAGNOSIS — Z82.49 FAMILY HISTORY OF PREMATURE CAD: ICD-10-CM

## 2023-05-01 DIAGNOSIS — R94.31 ABNORMAL EKG: ICD-10-CM

## 2023-05-01 DIAGNOSIS — R06.02 SOB (SHORTNESS OF BREATH) ON EXERTION: ICD-10-CM

## 2023-05-01 DIAGNOSIS — R42 DIZZINESS ON STANDING: ICD-10-CM

## 2023-05-01 DIAGNOSIS — Z01.818 PRE-OP EVALUATION: Primary | ICD-10-CM

## 2023-05-01 PROCEDURE — 99203 OFFICE O/P NEW LOW 30 MIN: CPT | Performed by: INTERNAL MEDICINE

## 2023-05-01 RX ORDER — PREDNISONE 20 MG/1
20 TABLET ORAL DAILY
COMMUNITY

## 2023-05-01 NOTE — PROGRESS NOTES
Chief Complaint   Patient presents with    New Patient     Abn ekg         New patient here for check up abn ekg and pre op eval for ovarian cyst laparoscopic surgery    EKG done 4/28/23    Denied chest pain    Sob on exertion for last 1 yrs    No leg edema    Occasional dizziness on sitting or standing mild one short lasting    Occasional palpitation once a months    Never smoked    Work as     FHX  Mother had had CABG in early 73's  Grandmother had MI in late 52's        Past Surgical History:   Procedure Laterality Date    TE STEROTACTIC LOC BREAST BIOPSY LEFT Left 08/13/2019    benign    SPINE SURGERY N/A 09/29/2021    fusion - [facility]       Allergies   Allergen Reactions    Erythromycin     Tetracyclines & Related         Family History   Problem Relation Age of Onset    Heart Disease Mother     Diabetes Father     High Cholesterol Father     Breast Cancer Paternal Cousin 43        Social History     Socioeconomic History    Marital status: Single     Spouse name: Not on file    Number of children: Not on file    Years of education: Not on file    Highest education level: Not on file   Occupational History    Not on file   Tobacco Use    Smoking status: Never    Smokeless tobacco: Never   Vaping Use    Vaping Use: Never used   Substance and Sexual Activity    Alcohol use: Yes     Comment: social    Drug use: Never    Sexual activity: Not on file   Other Topics Concern    Not on file   Social History Narrative    Not on file     Social Determinants of Health     Financial Resource Strain: Not on file   Food Insecurity: Not on file   Transportation Needs: Not on file   Physical Activity: Not on file   Stress: Not on file   Social Connections: Not on file   Intimate Partner Violence: Not on file   Housing Stability: Not on file       Current Outpatient Medications   Medication Sig Dispense Refill    predniSONE (DELTASONE) 20 MG tablet Take 1 tablet by mouth daily      valACYclovir (VALTREX) 500 MG tablet

## 2023-05-16 ENCOUNTER — HOSPITAL ENCOUNTER (OUTPATIENT)
Dept: NON INVASIVE DIAGNOSTICS | Age: 53
Discharge: HOME | End: 2023-05-16
Payer: COMMERCIAL

## 2023-05-16 DIAGNOSIS — Z01.818 PRE-OP EVALUATION: ICD-10-CM

## 2023-05-16 DIAGNOSIS — R42 DIZZINESS ON STANDING: ICD-10-CM

## 2023-05-16 DIAGNOSIS — R06.02 SOB (SHORTNESS OF BREATH) ON EXERTION: ICD-10-CM

## 2023-05-16 DIAGNOSIS — R94.31 ABNORMAL EKG: ICD-10-CM

## 2023-05-16 DIAGNOSIS — Z82.49 FAMILY HISTORY OF PREMATURE CAD: ICD-10-CM

## 2023-05-16 LAB
LV EF: 58 %
LV EF: 60 %
LVEF MODALITY: NORMAL
LVEF MODALITY: NORMAL

## 2023-05-16 PROCEDURE — 93017 CV STRESS TEST TRACING ONLY: CPT | Performed by: INTERNAL MEDICINE

## 2023-05-16 PROCEDURE — 3430000000 HC RX DIAGNOSTIC RADIOPHARMACEUTICAL: Performed by: INTERNAL MEDICINE

## 2023-05-16 PROCEDURE — 93306 TTE W/DOPPLER COMPLETE: CPT

## 2023-05-16 PROCEDURE — A9500 TC99M SESTAMIBI: HCPCS | Performed by: INTERNAL MEDICINE

## 2023-05-16 PROCEDURE — 78452 HT MUSCLE IMAGE SPECT MULT: CPT | Performed by: INTERNAL MEDICINE

## 2023-05-16 RX ORDER — TETRAKIS(2-METHOXYISOBUTYLISOCYANIDE)COPPER(I) TETRAFLUOROBORATE 1 MG/ML
32.8 INJECTION, POWDER, LYOPHILIZED, FOR SOLUTION INTRAVENOUS
Status: COMPLETED | OUTPATIENT
Start: 2023-05-16 | End: 2023-05-16

## 2023-05-16 RX ORDER — TETRAKIS(2-METHOXYISOBUTYLISOCYANIDE)COPPER(I) TETRAFLUOROBORATE 1 MG/ML
10 INJECTION, POWDER, LYOPHILIZED, FOR SOLUTION INTRAVENOUS
Status: COMPLETED | OUTPATIENT
Start: 2023-05-16 | End: 2023-05-16

## 2023-05-16 RX ADMIN — Medication 10 MILLICURIE: at 08:55

## 2023-05-16 RX ADMIN — Medication 32.8 MILLICURIE: at 10:05

## 2023-05-31 PROBLEM — Z01.818 PRE-OP EVALUATION: Status: RESOLVED | Noted: 2023-05-01 | Resolved: 2023-05-31

## 2023-06-01 ENCOUNTER — HOSPITAL ENCOUNTER (OUTPATIENT)
Age: 53
Discharge: HOME | End: 2023-06-01
Payer: COMMERCIAL

## 2023-06-01 LAB
ANION GAP SERPL CALC-SCNC: 16 MEQ/L (ref 8–16)
APTT PPP: 33.6 SECONDS (ref 22–38)
BUN SERPL-MCNC: 11 MG/DL (ref 7–22)
CALCIUM SERPL-MCNC: 9.2 MG/DL (ref 8.5–10.5)
CHLORIDE SERPL-SCNC: 102 MEQ/L (ref 98–111)
CO2 SERPL-SCNC: 23 MEQ/L (ref 23–33)
CREAT SERPL-MCNC: 0.6 MG/DL (ref 0.4–1.2)
DEPRECATED RDW RBC AUTO: 52.3 FL (ref 35–45)
ERYTHROCYTE [DISTWIDTH] IN BLOOD BY AUTOMATED COUNT: 13.4 % (ref 11.5–14.5)
GFR SERPL CREATININE-BSD FRML MDRD: > 60 ML/MIN/1.73M2
GLUCOSE SERPL-MCNC: 95 MG/DL (ref 70–108)
HCT VFR BLD AUTO: 44.8 % (ref 37–47)
HGB BLD-MCNC: 14.5 GM/DL (ref 12–16)
INR PPP: 0.87 (ref 0.85–1.13)
MCH RBC QN AUTO: 33.9 PG (ref 26–33)
MCHC RBC AUTO-ENTMCNC: 32.4 GM/DL (ref 32.2–35.5)
MCV RBC AUTO: 104.7 FL (ref 81–99)
PLATELET # BLD AUTO: 258 THOU/MM3 (ref 130–400)
PMV BLD AUTO: 9.2 FL (ref 9.4–12.4)
POTASSIUM SERPL-SCNC: 4.4 MEQ/L (ref 3.5–5.2)
RBC # BLD AUTO: 4.28 MILL/MM3 (ref 4.2–5.4)
SODIUM SERPL-SCNC: 141 MEQ/L (ref 135–145)
WBC # BLD AUTO: 3.7 THOU/MM3 (ref 4.8–10.8)

## 2023-06-01 PROCEDURE — 80048 BASIC METABOLIC PNL TOTAL CA: CPT

## 2023-06-01 PROCEDURE — 85610 PROTHROMBIN TIME: CPT

## 2023-06-01 PROCEDURE — 36415 COLL VENOUS BLD VENIPUNCTURE: CPT

## 2023-06-01 PROCEDURE — 85730 THROMBOPLASTIN TIME PARTIAL: CPT

## 2023-06-01 PROCEDURE — 85027 COMPLETE CBC AUTOMATED: CPT

## 2023-06-02 ENCOUNTER — OFFICE VISIT (OUTPATIENT)
Dept: CARDIOLOGY CLINIC | Age: 53
End: 2023-06-02
Payer: COMMERCIAL

## 2023-06-02 VITALS
HEIGHT: 62 IN | SYSTOLIC BLOOD PRESSURE: 133 MMHG | DIASTOLIC BLOOD PRESSURE: 91 MMHG | WEIGHT: 212.8 LBS | BODY MASS INDEX: 39.16 KG/M2 | HEART RATE: 111 BPM

## 2023-06-02 DIAGNOSIS — Z82.49 FAMILY HISTORY OF PREMATURE CAD: ICD-10-CM

## 2023-06-02 DIAGNOSIS — Z01.818 PRE-OP EVALUATION: Primary | ICD-10-CM

## 2023-06-02 DIAGNOSIS — R94.31 ABNORMAL EKG: ICD-10-CM

## 2023-06-02 DIAGNOSIS — R42 DIZZINESS ON STANDING: ICD-10-CM

## 2023-06-02 DIAGNOSIS — R06.02 SOB (SHORTNESS OF BREATH) ON EXERTION: ICD-10-CM

## 2023-06-02 PROCEDURE — 99214 OFFICE O/P EST MOD 30 MIN: CPT | Performed by: INTERNAL MEDICINE

## 2023-06-02 NOTE — PROGRESS NOTES
Chief Complaint   Patient presents with    1 Month [address] [facility],42-         Originally  patient here for check up abn ekg and pre op eval for ovarian cyst laparoscopic surgery      Pt here for a 1 month f/u    EKG done 4-20-23    Denied chest pain    Sob on exertion for last 1 yrs    No leg edema    Occasional dizziness on sitting or standing mild one short lasting for few yrs    Occasional palpitation once a months    Never smoked    Work as     FHX  Mother had had CABG in early 73's  Grandmother had MI in late 52's        Past Surgical History:   Procedure Laterality Date    CERVICAL FUSION  09/29/2021    [facility]    EAR TUBE REMOVAL      TE STEROTACTIC LOC BREAST BIOPSY LEFT Left 08/13/2019    benign    TONSILLECTOMY AND ADENOIDECTOMY         Allergies   Allergen Reactions    Erythromycin Nausea Only    Tetracyclines & Related Hives        Family History   Problem Relation Age of Onset    Heart Disease Mother     Diabetes Father     High Cholesterol Father     Breast Cancer Paternal Cousin 43        Social History     Socioeconomic History    Marital status: Single     Spouse name: Not on file    Number of children: Not on file    Years of education: Not on file    Highest education level: Not on file   Occupational History    Not on file   Tobacco Use    Smoking status: Never    Smokeless tobacco: Never   Vaping Use    Vaping Use: Never used   Substance and Sexual Activity    Alcohol use: Yes     Comment: 2-3 wine per day    Drug use: Never    Sexual activity: Not on file   Other Topics Concern    Not on file   Social History Narrative    Not on file     Social Determinants of Health     Financial Resource Strain: Not on file   Food Insecurity: Not on file   Transportation Needs: Not on file   Physical Activity: Not on file   Stress: Not on file   Social Connections: Not on file   Intimate Partner Violence: Not on file   Housing Stability: Not on file       Current Outpatient Medications   Medication

## 2023-06-07 ENCOUNTER — HOSPITAL ENCOUNTER (EMERGENCY)
Age: 53
Discharge: HOME | End: 2023-06-07
Payer: COMMERCIAL

## 2023-06-07 ENCOUNTER — PREP FOR PROCEDURE (OUTPATIENT)
Dept: OBGYN | Age: 53
End: 2023-06-07

## 2023-06-07 VITALS
HEART RATE: 112 BPM | SYSTOLIC BLOOD PRESSURE: 145 MMHG | OXYGEN SATURATION: 96 % | BODY MASS INDEX: 39.01 KG/M2 | TEMPERATURE: 97.8 F | WEIGHT: 212 LBS | HEIGHT: 62 IN | DIASTOLIC BLOOD PRESSURE: 78 MMHG | RESPIRATION RATE: 16 BRPM

## 2023-06-07 DIAGNOSIS — R60.0 PEDAL EDEMA: Primary | ICD-10-CM

## 2023-06-07 PROCEDURE — 99203 OFFICE O/P NEW LOW 30 MIN: CPT | Performed by: NURSE PRACTITIONER

## 2023-06-07 RX ORDER — FUROSEMIDE 20 MG/1
20 TABLET ORAL DAILY
Qty: 5 TABLET | Refills: 0 | Status: SHIPPED | OUTPATIENT
Start: 2023-06-07 | End: 2023-06-12

## 2023-06-07 RX ORDER — SODIUM CHLORIDE 0.9 % (FLUSH) 0.9 %
5-40 SYRINGE (ML) INJECTION PRN
Status: CANCELLED | OUTPATIENT
Start: 2023-06-07

## 2023-06-07 RX ORDER — SODIUM CHLORIDE 9 MG/ML
INJECTION, SOLUTION INTRAVENOUS PRN
Status: CANCELLED | OUTPATIENT
Start: 2023-06-07

## 2023-06-07 RX ORDER — SODIUM CHLORIDE 0.9 % (FLUSH) 0.9 %
5-40 SYRINGE (ML) INJECTION EVERY 12 HOURS SCHEDULED
Status: CANCELLED | OUTPATIENT
Start: 2023-06-07

## 2023-06-07 RX ORDER — POTASSIUM CHLORIDE 750 MG/1
10 TABLET, FILM COATED, EXTENDED RELEASE ORAL DAILY
Qty: 5 TABLET | Refills: 0 | Status: SHIPPED | OUTPATIENT
Start: 2023-06-07 | End: 2023-06-12

## 2023-06-07 RX ORDER — ONDANSETRON 2 MG/ML
8 INJECTION INTRAMUSCULAR; INTRAVENOUS EVERY 8 HOURS PRN
Status: CANCELLED | OUTPATIENT
Start: 2023-06-07

## 2023-06-07 RX ORDER — SODIUM CHLORIDE, SODIUM LACTATE, POTASSIUM CHLORIDE, CALCIUM CHLORIDE 600; 310; 30; 20 MG/100ML; MG/100ML; MG/100ML; MG/100ML
INJECTION, SOLUTION INTRAVENOUS CONTINUOUS
Status: CANCELLED | OUTPATIENT
Start: 2023-06-07

## 2023-06-07 ASSESSMENT — ENCOUNTER SYMPTOMS
DIARRHEA: 0
SORE THROAT: 0
COUGH: 0
NAUSEA: 0
RHINORRHEA: 0
TROUBLE SWALLOWING: 0
VOMITING: 0
SHORTNESS OF BREATH: 0
EYE DISCHARGE: 0
EYE REDNESS: 0

## 2023-06-07 ASSESSMENT — PAIN DESCRIPTION - ONSET: ONSET: ON-GOING

## 2023-06-07 ASSESSMENT — PAIN DESCRIPTION - DESCRIPTORS: DESCRIPTORS: DISCOMFORT

## 2023-06-07 ASSESSMENT — PAIN - FUNCTIONAL ASSESSMENT: PAIN_FUNCTIONAL_ASSESSMENT: 0-10

## 2023-06-07 ASSESSMENT — PAIN SCALES - GENERAL: PAINLEVEL_OUTOF10: 5

## 2023-06-07 ASSESSMENT — PAIN DESCRIPTION - PAIN TYPE: TYPE: ACUTE PAIN

## 2023-06-07 ASSESSMENT — PAIN DESCRIPTION - FREQUENCY: FREQUENCY: INTERMITTENT

## 2023-06-07 NOTE — H&P
[facility]      Patient Name: Santo Burton   Patient ID: [id number]   Sex: Female   YOB: 1970         Visit Date: May 31, 2023    Provider: Ava Segura. Hina Tamayo DO   Location: [facility]   Location Address: [address] [place] [address] Wendy Aragon [address] [facility], [address] [facility]   Location Phone: [phone]          Chief Complaint   Ovarian Cyst       History Of Present Illness   The patient presents for a diagnostic laparoscopy and ovary cyst drainage - unclear based on US which ovary. One US showed right and then same size cyst on left ovary in other US. May be same ovary just position difficult to determine which ovary it is on US. Discussed with pt will drain cyst from ovary no matter which size it is. The risks of the procedure were reviewed including infection, hemorrhage, and injury to bladder or bowel or ureter. Alternatives to the procedure were also discussed including doing nothing and medical treatments. The procedure was reviewed in detail as well as what to expect postoperatively. All the patient's questions were answered and she demonstrated an understanding of our discussion. This is a 48year old /White female , whose LMP was 2021, presents for the evaluation of an ovarian cyst that measures 6.5 X 7.5 X 5.2 cm   She also complains of over the last several years she has been dealing with diarrhea, has had an apt with GI, has had a colonoscopy, was dx with gastritis. .     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 pain with initial penetration  EKG and cardiac clearance is done, she follows up with her cardiologist on Friday  menses are absent due to menopause, no spotting or bleeding.            Past Medical History   Disease Name Date Onset Notes   Atrophic

## 2023-06-07 NOTE — DISCHARGE INSTRUCTIONS
Take medications as prescribed. Increase fluids over the next few days. Wear compression stockings. Follow-up with your PCP in 3 to 5 days for recheck. Proceed to the nearest emergency room for development of fever greater than 101.5 for greater than 3 days, chest pain, shortness of breath, pain in the calf, or other unusual symptoms.

## 2023-06-07 NOTE — ED NOTES
Pt presents to [facility] for c/o right lower ankle swelling that started last night.  Pt denies any injuries      Chau Penny, ALTHEA  [birth year] [id number]

## 2023-06-07 NOTE — ED PROVIDER NOTES
[facility]      CHIEF COMPLAINT       Chief Complaint   Patient presents with    Joint Swelling     Right, NKI       Nurses Notes reviewed and I agree except as noted in the HPI. HISTORY OF PRESENT ILLNESS   Airam Foster is a 48 y.o. female who presents with a 1 day history of bilateral ankle swelling with the right being greater than the left. Patient denies injury. She denies shortness of breath, chest pain, calf pain, fever, redness, or other unusual symptoms at this time. She is eating and drinking well. She has no heart history. She has had no recent travel greater than 4 hours, is not on birth control, and no recent surgeries. REVIEW OF SYSTEMS     Review of Systems   Constitutional:  Negative for chills, diaphoresis, fatigue and fever. HENT:  Negative for congestion, ear pain, rhinorrhea, sore throat and trouble swallowing. Eyes:  Negative for discharge and redness. Respiratory:  Negative for cough and shortness of breath. Cardiovascular:  Negative for chest pain. Gastrointestinal:  Negative for diarrhea, nausea and vomiting. Genitourinary:  Negative for decreased urine volume. Musculoskeletal:  Negative for neck pain and neck stiffness. Skin:  Negative for rash. Neurological:  Negative for headaches. Hematological:  Negative for adenopathy. Psychiatric/Behavioral:  Negative for sleep disturbance. PAST MEDICAL HISTORY         Diagnosis Date    Gastric inflammation     Sleep apnea     has Bipap       SURGICAL HISTORY     Patient  has a past surgical history that includes [facility] STEREO BREAST BX W LOC DEVICE 1ST LESION LEFT (Left, 08/13/2019); cervical fusion (09/29/2021);  Tonsillectomy and adenoidectomy; and Ear tube removal.    CURRENT MEDICATIONS       Previous Medications    GLUCOS-CHONDROIT-COLLAG-HYAL (GLUCOSAMINE CHONDROIT-COLLAGEN PO)    Take 1 tablet by mouth daily    MULTIPLE VITAMINS-MINERALS (WOMENS MULTI)

## 2023-06-09 ENCOUNTER — ANESTHESIA EVENT (OUTPATIENT)
Dept: OPERATING ROOM | Age: 53
End: 2023-06-09
Payer: COMMERCIAL

## 2023-06-09 ENCOUNTER — ANESTHESIA (OUTPATIENT)
Dept: OPERATING ROOM | Age: 53
End: 2023-06-09
Payer: COMMERCIAL

## 2023-06-09 ENCOUNTER — HOSPITAL ENCOUNTER (OUTPATIENT)
Age: 53
Setting detail: OUTPATIENT SURGERY
Discharge: HOME | End: 2023-06-09
Attending: OBSTETRICS & GYNECOLOGY | Admitting: OBSTETRICS & GYNECOLOGY
Payer: COMMERCIAL

## 2023-06-09 VITALS
OXYGEN SATURATION: 93 % | TEMPERATURE: 96.4 F | RESPIRATION RATE: 16 BRPM | HEART RATE: 80 BPM | HEIGHT: 62 IN | WEIGHT: 215.8 LBS | SYSTOLIC BLOOD PRESSURE: 124 MMHG | BODY MASS INDEX: 39.71 KG/M2 | DIASTOLIC BLOOD PRESSURE: 62 MMHG

## 2023-06-09 DIAGNOSIS — G89.18 POST-OP PAIN: Primary | ICD-10-CM

## 2023-06-09 PROCEDURE — 2500000003 HC RX 250 WO HCPCS: Performed by: NURSE ANESTHETIST, CERTIFIED REGISTERED

## 2023-06-09 PROCEDURE — 6360000002 HC RX W HCPCS: Performed by: ANESTHESIOLOGY

## 2023-06-09 PROCEDURE — 6360000002 HC RX W HCPCS: Performed by: OBSTETRICS & GYNECOLOGY

## 2023-06-09 PROCEDURE — 6360000002 HC RX W HCPCS: Performed by: NURSE ANESTHETIST, CERTIFIED REGISTERED

## 2023-06-09 PROCEDURE — 2580000003 HC RX 258: Performed by: OBSTETRICS & GYNECOLOGY

## 2023-06-09 PROCEDURE — 6370000000 HC RX 637 (ALT 250 FOR IP): Performed by: OBSTETRICS & GYNECOLOGY

## 2023-06-09 RX ORDER — SODIUM CHLORIDE 9 MG/ML
INJECTION, SOLUTION INTRAVENOUS PRN
Status: DISCONTINUED | OUTPATIENT
Start: 2023-06-09 | End: 2023-06-09 | Stop reason: HOSPADM

## 2023-06-09 RX ORDER — ROCURONIUM BROMIDE 10 MG/ML
INJECTION, SOLUTION INTRAVENOUS PRN
Status: DISCONTINUED | OUTPATIENT
Start: 2023-06-09 | End: 2023-06-09 | Stop reason: SDUPTHER

## 2023-06-09 RX ORDER — ACETAMINOPHEN 500 MG
1000 TABLET ORAL EVERY 8 HOURS PRN
Status: CANCELLED | OUTPATIENT
Start: 2023-06-09

## 2023-06-09 RX ORDER — DEXAMETHASONE SODIUM PHOSPHATE 10 MG/ML
INJECTION, EMULSION INTRAMUSCULAR; INTRAVENOUS PRN
Status: DISCONTINUED | OUTPATIENT
Start: 2023-06-09 | End: 2023-06-09 | Stop reason: SDUPTHER

## 2023-06-09 RX ORDER — HYDROCODONE BITARTRATE AND ACETAMINOPHEN 5; 325 MG/1; MG/1
1 TABLET ORAL EVERY 6 HOURS PRN
Status: DISCONTINUED | OUTPATIENT
Start: 2023-06-09 | End: 2023-06-09 | Stop reason: HOSPADM

## 2023-06-09 RX ORDER — HYDROCODONE BITARTRATE AND ACETAMINOPHEN 5; 325 MG/1; MG/1
1 TABLET ORAL ONCE
Status: COMPLETED | OUTPATIENT
Start: 2023-06-09 | End: 2023-06-09

## 2023-06-09 RX ORDER — SODIUM CHLORIDE 0.9 % (FLUSH) 0.9 %
5-40 SYRINGE (ML) INJECTION PRN
Status: CANCELLED | OUTPATIENT
Start: 2023-06-09

## 2023-06-09 RX ORDER — DROPERIDOL 2.5 MG/ML
0.62 INJECTION, SOLUTION INTRAMUSCULAR; INTRAVENOUS
Status: DISCONTINUED | OUTPATIENT
Start: 2023-06-09 | End: 2023-06-09 | Stop reason: HOSPADM

## 2023-06-09 RX ORDER — FENTANYL CITRATE 50 UG/ML
50 INJECTION, SOLUTION INTRAMUSCULAR; INTRAVENOUS EVERY 5 MIN PRN
Status: DISCONTINUED | OUTPATIENT
Start: 2023-06-09 | End: 2023-06-09 | Stop reason: HOSPADM

## 2023-06-09 RX ORDER — SODIUM CHLORIDE 0.9 % (FLUSH) 0.9 %
5-40 SYRINGE (ML) INJECTION EVERY 12 HOURS SCHEDULED
Status: DISCONTINUED | OUTPATIENT
Start: 2023-06-09 | End: 2023-06-09 | Stop reason: HOSPADM

## 2023-06-09 RX ORDER — FENTANYL CITRATE 50 UG/ML
INJECTION, SOLUTION INTRAMUSCULAR; INTRAVENOUS PRN
Status: DISCONTINUED | OUTPATIENT
Start: 2023-06-09 | End: 2023-06-09 | Stop reason: SDUPTHER

## 2023-06-09 RX ORDER — ONDANSETRON 2 MG/ML
INJECTION INTRAMUSCULAR; INTRAVENOUS PRN
Status: DISCONTINUED | OUTPATIENT
Start: 2023-06-09 | End: 2023-06-09 | Stop reason: SDUPTHER

## 2023-06-09 RX ORDER — KETOROLAC TROMETHAMINE 30 MG/ML
30 INJECTION, SOLUTION INTRAMUSCULAR; INTRAVENOUS ONCE
Status: COMPLETED | OUTPATIENT
Start: 2023-06-09 | End: 2023-06-09

## 2023-06-09 RX ORDER — ONDANSETRON 2 MG/ML
8 INJECTION INTRAMUSCULAR; INTRAVENOUS EVERY 8 HOURS PRN
Status: DISCONTINUED | OUTPATIENT
Start: 2023-06-09 | End: 2023-06-09 | Stop reason: HOSPADM

## 2023-06-09 RX ORDER — SODIUM CHLORIDE, SODIUM LACTATE, POTASSIUM CHLORIDE, CALCIUM CHLORIDE 600; 310; 30; 20 MG/100ML; MG/100ML; MG/100ML; MG/100ML
INJECTION, SOLUTION INTRAVENOUS CONTINUOUS
Status: DISCONTINUED | OUTPATIENT
Start: 2023-06-09 | End: 2023-06-09 | Stop reason: HOSPADM

## 2023-06-09 RX ORDER — ONDANSETRON 2 MG/ML
4 INJECTION INTRAMUSCULAR; INTRAVENOUS
Status: DISCONTINUED | OUTPATIENT
Start: 2023-06-09 | End: 2023-06-09 | Stop reason: HOSPADM

## 2023-06-09 RX ORDER — MORPHINE SULFATE 2 MG/ML
2 INJECTION, SOLUTION INTRAMUSCULAR; INTRAVENOUS EVERY 5 MIN PRN
Status: DISCONTINUED | OUTPATIENT
Start: 2023-06-09 | End: 2023-06-09 | Stop reason: HOSPADM

## 2023-06-09 RX ORDER — ONDANSETRON 4 MG/1
4 TABLET, ORALLY DISINTEGRATING ORAL EVERY 8 HOURS PRN
Status: CANCELLED | OUTPATIENT
Start: 2023-06-09

## 2023-06-09 RX ORDER — HYDRALAZINE HYDROCHLORIDE 20 MG/ML
10 INJECTION INTRAMUSCULAR; INTRAVENOUS
Status: DISCONTINUED | OUTPATIENT
Start: 2023-06-09 | End: 2023-06-09 | Stop reason: HOSPADM

## 2023-06-09 RX ORDER — SODIUM CHLORIDE, SODIUM LACTATE, POTASSIUM CHLORIDE, CALCIUM CHLORIDE 600; 310; 30; 20 MG/100ML; MG/100ML; MG/100ML; MG/100ML
INJECTION, SOLUTION INTRAVENOUS SEE ADMIN INSTRUCTIONS
Status: CANCELLED | OUTPATIENT
Start: 2023-06-09

## 2023-06-09 RX ORDER — MORPHINE SULFATE 2 MG/ML
2 INJECTION, SOLUTION INTRAMUSCULAR; INTRAVENOUS
Status: CANCELLED | OUTPATIENT
Start: 2023-06-09

## 2023-06-09 RX ORDER — PROPOFOL 10 MG/ML
INJECTION, EMULSION INTRAVENOUS PRN
Status: DISCONTINUED | OUTPATIENT
Start: 2023-06-09 | End: 2023-06-09 | Stop reason: SDUPTHER

## 2023-06-09 RX ORDER — MORPHINE SULFATE 2 MG/ML
4 INJECTION, SOLUTION INTRAMUSCULAR; INTRAVENOUS
Status: CANCELLED | OUTPATIENT
Start: 2023-06-09

## 2023-06-09 RX ORDER — SODIUM CHLORIDE 0.9 % (FLUSH) 0.9 %
5-40 SYRINGE (ML) INJECTION PRN
Status: DISCONTINUED | OUTPATIENT
Start: 2023-06-09 | End: 2023-06-09 | Stop reason: HOSPADM

## 2023-06-09 RX ORDER — KETOROLAC TROMETHAMINE 30 MG/ML
30 INJECTION, SOLUTION INTRAMUSCULAR; INTRAVENOUS EVERY 6 HOURS
Status: CANCELLED | OUTPATIENT
Start: 2023-06-09 | End: 2023-06-11

## 2023-06-09 RX ORDER — ONDANSETRON 2 MG/ML
4 INJECTION INTRAMUSCULAR; INTRAVENOUS EVERY 6 HOURS PRN
Status: CANCELLED | OUTPATIENT
Start: 2023-06-09

## 2023-06-09 RX ORDER — HYDROCODONE BITARTRATE AND ACETAMINOPHEN 5; 325 MG/1; MG/1
1 TABLET ORAL EVERY 6 HOURS PRN
Qty: 3 TABLET | Refills: 0 | Status: SHIPPED | OUTPATIENT
Start: 2023-06-09 | End: 2023-06-12

## 2023-06-09 RX ORDER — LABETALOL HYDROCHLORIDE 5 MG/ML
10 INJECTION, SOLUTION INTRAVENOUS
Status: DISCONTINUED | OUTPATIENT
Start: 2023-06-09 | End: 2023-06-09 | Stop reason: HOSPADM

## 2023-06-09 RX ORDER — SODIUM CHLORIDE 0.9 % (FLUSH) 0.9 %
5-40 SYRINGE (ML) INJECTION EVERY 12 HOURS SCHEDULED
Status: CANCELLED | OUTPATIENT
Start: 2023-06-09

## 2023-06-09 RX ORDER — DIPHENHYDRAMINE HYDROCHLORIDE 50 MG/ML
12.5 INJECTION INTRAMUSCULAR; INTRAVENOUS
Status: DISCONTINUED | OUTPATIENT
Start: 2023-06-09 | End: 2023-06-09 | Stop reason: HOSPADM

## 2023-06-09 RX ORDER — SODIUM CHLORIDE 9 MG/ML
INJECTION, SOLUTION INTRAVENOUS PRN
Status: CANCELLED | OUTPATIENT
Start: 2023-06-09

## 2023-06-09 RX ORDER — LORAZEPAM 2 MG/ML
0.5 INJECTION INTRAMUSCULAR
Status: DISCONTINUED | OUTPATIENT
Start: 2023-06-09 | End: 2023-06-09 | Stop reason: HOSPADM

## 2023-06-09 RX ORDER — MIDAZOLAM HYDROCHLORIDE 1 MG/ML
INJECTION INTRAMUSCULAR; INTRAVENOUS PRN
Status: DISCONTINUED | OUTPATIENT
Start: 2023-06-09 | End: 2023-06-09 | Stop reason: SDUPTHER

## 2023-06-09 RX ORDER — MEPERIDINE HYDROCHLORIDE 25 MG/ML
12.5 INJECTION INTRAMUSCULAR; INTRAVENOUS; SUBCUTANEOUS EVERY 5 MIN PRN
Status: DISCONTINUED | OUTPATIENT
Start: 2023-06-09 | End: 2023-06-09 | Stop reason: HOSPADM

## 2023-06-09 RX ORDER — LIDOCAINE HYDROCHLORIDE 20 MG/ML
INJECTION, SOLUTION EPIDURAL; INFILTRATION; INTRACAUDAL; PERINEURAL PRN
Status: DISCONTINUED | OUTPATIENT
Start: 2023-06-09 | End: 2023-06-09 | Stop reason: SDUPTHER

## 2023-06-09 RX ADMIN — SODIUM CHLORIDE: 9 INJECTION, SOLUTION INTRAVENOUS at 07:30

## 2023-06-09 RX ADMIN — ONDANSETRON 4 MG: 2 INJECTION INTRAMUSCULAR; INTRAVENOUS at 08:00

## 2023-06-09 RX ADMIN — DEXAMETHASONE SODIUM PHOSPHATE 10 MG: 10 INJECTION, EMULSION INTRAMUSCULAR; INTRAVENOUS at 07:42

## 2023-06-09 RX ADMIN — FENTANYL CITRATE 100 MCG: 50 INJECTION, SOLUTION INTRAMUSCULAR; INTRAVENOUS at 07:35

## 2023-06-09 RX ADMIN — KETOROLAC TROMETHAMINE 30 MG: 30 INJECTION, SOLUTION INTRAMUSCULAR; INTRAVENOUS at 08:58

## 2023-06-09 RX ADMIN — PROPOFOL 170 MG: 10 INJECTION, EMULSION INTRAVENOUS at 07:35

## 2023-06-09 RX ADMIN — MIDAZOLAM 2 MG: 1 INJECTION INTRAMUSCULAR; INTRAVENOUS at 07:30

## 2023-06-09 RX ADMIN — ROCURONIUM BROMIDE 40 MG: 10 INJECTION INTRAVENOUS at 07:35

## 2023-06-09 RX ADMIN — HYDROCODONE BITARTRATE AND ACETAMINOPHEN 1 TABLET: 5; 325 TABLET ORAL at 08:57

## 2023-06-09 RX ADMIN — SUGAMMADEX 200 MG: 100 INJECTION, SOLUTION INTRAVENOUS at 08:00

## 2023-06-09 RX ADMIN — LIDOCAINE HYDROCHLORIDE 40 MG: 20 INJECTION, SOLUTION EPIDURAL; INFILTRATION; INTRACAUDAL; PERINEURAL at 07:35

## 2023-06-09 RX ADMIN — FENTANYL CITRATE 50 MCG: 50 INJECTION, SOLUTION INTRAMUSCULAR; INTRAVENOUS at 08:25

## 2023-06-09 ASSESSMENT — PAIN - FUNCTIONAL ASSESSMENT: PAIN_FUNCTIONAL_ASSESSMENT: 0-10

## 2023-06-09 ASSESSMENT — PAIN SCALES - GENERAL
PAINLEVEL_OUTOF10: 9
PAINLEVEL_OUTOF10: 8
PAINLEVEL_OUTOF10: 8
PAINLEVEL_OUTOF10: 5

## 2023-06-09 ASSESSMENT — PAIN DESCRIPTION - LOCATION
LOCATION: ABDOMEN

## 2023-06-09 ASSESSMENT — PAIN DESCRIPTION - DESCRIPTORS: DESCRIPTORS: SHARP;SORE

## 2023-06-09 ASSESSMENT — ENCOUNTER SYMPTOMS: SHORTNESS OF BREATH: 1

## 2023-06-09 NOTE — PROGRESS NOTES
0153- Patient arrived to Phase I via cart. Patient awake and arouses easily to voice. Lena pad and abdominal incisions WNL. VSS.  0815- VSS.  0820- VSS. Patient with complaints of slight abdominal pain - warm blanket applied to abdomen. 0825- VSS. Patient states pain sharp, 9/10 - requests PRN pain medication - administered per orders. 0830- VSS.  0835- VSS. Patient remains awake and alert. Patient states pain slightly improved - states ok to wait for PO medication. Leonides Pals Dr. Orland Cogan to notify of patient pain. Orders received. 0845- VSS.   0850- VSS.  [id number]- Patient to Phase II via cart. Patient awake and alert. Positioned for comfort. Drink and snack provided. Call light in reach. 2929- Medications administered per orders. 9256- Patient states pain improved. 3274- Discharge instructions reviewed with patient and boyfriend at bedside. All questions answered. 3340- Patient sat edge of bed tolerated well. Dressed without difficulty. 1157- Patient to restroom via wheelchair. 6999- Patient discharged via wheelchair with boyfriend.

## 2023-06-09 NOTE — PROGRESS NOTES
0715 DR. KENNEDY IN TO SEE. UPDATED THAT PATIENT WAS IN ER RECENTLY FOR LOWER LEG EDEMA BILATERALLY. WAS PRESCRIBED A DIURTIC  BUT DID NOT START YET. HAS TRACE BILATERAL EDEMA ON ADMISSION.

## 2023-06-09 NOTE — DISCHARGE INSTRUCTIONS
Discharge Instructions  Call Surgeon if you have:  Temperature greater than 100.4  Persistent nausea and vomiting  Severe uncontrolled pain  Redness, tenderness, or signs of infection (pain, swelling, redness, odor or green/yellow discharge around the site)  Difficulty breathing, headache or visual disturbances  Hives  Persistent dizziness or light-headedness  Extreme fatigue  Any other questions or concerns you may have after discharge    In an emergency, call 911 or go to an Emergency Department at a nearby hospital    It is important to bring a complete, current list of your medications to any medical appointments or hospitalizations. Pain:  Ibuprofen every 6 hours as needed, call if no relief. Activity:  Take it easy for 1-2 days  Exercise:  None for 1 week  Sex, douching, tampons:  None for 1 week  Shower: In 24 hours  Tub bath, swimming:  None for 1 week      REMINDER:   Carry a list of your medications and allergies with you at all times  Call your pharmacy at least 1 week in advance to refill prescriptions  No driving for 24 hours after anesthesia    Diet: Resume your usual diet. Good nutrition promotes healing. Increase fluid intake. Follow up 1 week . Call [phone] for appointment.

## 2023-06-09 NOTE — INTERVAL H&P NOTE
[facility]  History and Physical Update    Pt Name: Mindi Greene  MRN: [id number]  YOB: 1970  Date of evaluation: 6/9/2023    [x] I have examined the patient and reviewed the H&P/Consult and there are no changes to the patient or plans.     [] I have examined the patient and reviewed the H&P/Consult and have noted the following changes:        Pete Peñaloza DO, DO  Electronically signed 6/9/2023 at 7:22 AM

## 2023-06-09 NOTE — OP NOTE
[address]                                    [place], [place]                                   RECORD OF OPERATION       PREOPERATIVE DIAGNOSES:  Persistent right ovarian cyst     POSTOPERATIVE DIAGNOSES:  Right ovarian cyst     PROCEDURE:     Diagnostic laparoscopy, drainage of right ovarian cyst     SURGEON:  Dr. Mike Charlton. Orland Cogan. ANESTHESIA:  General.     ESTIMATED BLOOD LOSS:  5 ml. COMPLICATIONS:  None. SPECIMENS:  none     FINDINGS:   Cervical stenosis, normal uterus, normal left tube and ovary, enlarged right ovary approximately 6cm in size with simple cyst, no torsion     PROCEDURE:  After signing informed consent, the patient was taken to the Operating Room and placed in a supine position and given general anesthesia. The patient was then placed in a dorsolithotomy position and prepped and draped in the normal fashion. Two Connor retractors were used in the vagina to identify the cervix. The anterior lip of the cervix was grasped with a single-tooth tenaculum. An attempt was made to dilate the cervix, but cervical os was stenotic and therefor a uterine manipulator was not placed. All other instruments were removed from the vagina. Attention was placed to the abdomen. A 5 mm incision was made in the umbilicus in the midline position. A 5 mm trocar was placed under direct visualization. The abdomen was inflated with CO2 gas. Above findings were noted. A left lower quadrant 5 mm port was placed under direct visualization. The cyst was punctured with Endoshears and the clear fluid suctioned totaling 100cc of fluid. Good hemostasis noted. All instrument were removed from the abdomen. All skin incisions were closed with 4-0 Vicryl, Mastisol and Steri-Strips. Counts were correct x2. The patient was transferred to Recovery in stable condition. Attempted to print photos taken and they did not capture. Karina Merrill.

## 2023-06-09 NOTE — ANESTHESIA PRE PROCEDURE
Department of Anesthesiology  Preprocedure Note       Name:  Tessa Robertson   Age:  48 y.o.  :  1970                                          MRN:  [id number]         Date:  2023      Surgeon: Charlotte Sam):  Shelby Anderson DO    Procedure: Procedure(s):  Diagnostic Laparoscopy with Drainage of Right Ovarian Cyst    Medications prior to admission:   Prior to Admission medications    Medication Sig Start Date End Date Taking? Authorizing Provider   furosemide (LASIX) 20 MG tablet Take 1 tablet by mouth daily for 5 days Take with potassium, 23  GILA Braswell CNP   potassium chloride (KLOR-CON) 10 MEQ extended release tablet Take 1 tablet by mouth daily for 5 days Take daily with Lasix 23  GILA Braswell CNP   valACYclovir (VALTREX) 500 MG tablet take 1 tablet by mouth once daily 10/6/22   Historical Provider, MD   omeprazole (PRILOSEC) 40 MG delayed release capsule Take 1 capsule by mouth daily 10/26/22   Carmencita Grace MD   Multiple Vitamins-Minerals (WOMENS MULTI) CAPS Take by mouth daily    Historical Provider, MD   Glucos-Chondroit-Collag-Hyal (GLUCOSAMINE CHONDROIT-COLLAGEN PO) Take 1 tablet by mouth daily    Historical Provider, MD       Current medications:    Current Facility-Administered Medications   Medication Dose Route Frequency Provider Last Rate Last Admin    lactated ringers IV soln infusion   IntraVENous Continuous Shelby Anderson DO        sodium chloride flush 0.9 % injection 5-40 mL  5-40 mL IntraVENous 2 times per day Shelby Anderson DO        sodium chloride flush 0.9 % injection 5-40 mL  5-40 mL IntraVENous PRN Shelby Anderson DO        0.9 % sodium chloride infusion   IntraVENous PRN Shelby Anderson DO        [facility]) injection 8 mg  8 mg IntraVENous Q8H PRN Shelby Anderson DO           Allergies:     Allergies   Allergen Reactions    Erythromycin Nausea Only    Tetracyclines & Related Hives

## 2023-06-09 NOTE — ANESTHESIA POSTPROCEDURE EVALUATION
Department of Anesthesiology  Postprocedure Note    Patient: Loring Litten  MRN: [id number]  YOB: 1970  Date of evaluation: 6/9/2023      Procedure Summary     Date: 06/09/23 Room / Location: [facility] 03 / [facility]    Anesthesia Start: 0730 Anesthesia Stop: 8489    Procedure: Diagnostic Laparoscopy with Drainage of Right Ovarian Cyst (Right) Diagnosis:       Cyst of right ovary      Dyspareunia due to medical condition in female      (Cyst of right ovary [N83.201])      (Dyspareunia due to medical condition in female [N94.19])    Surgeons: Paulie Hernandez DO Responsible Provider: Gretchen Wagner MD    Anesthesia Type: general ASA Status: 2          Anesthesia Type: No value filed.     Angel Phase I: Angel Score: 10    Angel Phase II:        Anesthesia Post Evaluation    Complications: no

## 2023-07-02 PROBLEM — Z01.818 PRE-OP EVALUATION: Status: RESOLVED | Noted: 2023-05-01 | Resolved: 2023-07-02

## 2023-07-27 ENCOUNTER — OFFICE VISIT (OUTPATIENT)
Dept: PULMONOLOGY | Age: 53
End: 2023-07-27
Payer: COMMERCIAL

## 2023-07-27 VITALS
SYSTOLIC BLOOD PRESSURE: 124 MMHG | TEMPERATURE: 98.1 F | OXYGEN SATURATION: 96 % | WEIGHT: 214.4 LBS | HEIGHT: 62 IN | DIASTOLIC BLOOD PRESSURE: 88 MMHG | BODY MASS INDEX: 39.45 KG/M2 | HEART RATE: 91 BPM

## 2023-07-27 DIAGNOSIS — Z78.9 DIFFICULTY WITH BIPAP USE: ICD-10-CM

## 2023-07-27 DIAGNOSIS — G47.33 OSA (OBSTRUCTIVE SLEEP APNEA): Primary | ICD-10-CM

## 2023-07-27 DIAGNOSIS — E66.01 CLASS 2 SEVERE OBESITY DUE TO EXCESS CALORIES WITH SERIOUS COMORBIDITY AND BODY MASS INDEX (BMI) OF 39.0 TO 39.9 IN ADULT (HCC): ICD-10-CM

## 2023-07-27 PROCEDURE — 99214 OFFICE O/P EST MOD 30 MIN: CPT | Performed by: NURSE PRACTITIONER

## 2023-07-27 ASSESSMENT — ENCOUNTER SYMPTOMS
EYES NEGATIVE: 1
SHORTNESS OF BREATH: 0
ABDOMINAL PAIN: 0
COUGH: 0
VOMITING: 0
DIARRHEA: 0
WHEEZING: 0
NAUSEA: 0

## 2023-07-27 NOTE — PROGRESS NOTES
[facility]      Sunil Lagos         [id number]  7/27/2023   Chief Complaint   Patient presents with    Follow-up     Allan after pressure change        Pt of Dr. Rolan Elam     PAP Download:   Original or initial AHI: 62.2     Date of initial study: 12.19.22        Compliant  73%     Noncompliant 23 %     PAP Type spont Level  10/6   Avg Hrs/Day 4.5  AHI: 3.2   Leaks : 95 th percentile: 8.9   Recorded compliance dates 052618-098521  Machine/Mfg:   [x] ResMed    [] Respironics/Dreamstation   Interface:   [] Nasal    [x] Nasal pillows   [] FFM      Provider:      [x] SR-HME     []Michele     [] [employer]    [] [place]    [] [place]               [] [employer]      [] Adaptive    [] [facility] [address]:      [] Other    Neck Size: 14.75  Mallampati 4  ESS:  0  SAQLI: 81    Here is a scan of the most recent download:                  Presentation:   Jayden Boudreaux presents for 1 month sleep medicine follow up for obstructive sleep apnea  Since the last visit, Jayden Boudreaux is tolerating her PAP better with lower pressure but is still struggling to get good sleep . Often feels suffocated and bothered by wearing the mask . Is wearing for bare minimum of 4 hours and will take it off to sleep longer. Progress History:   Since last visit any new medical issues? No  Any trouble with Machine No  Any new sleep medicines? no  Trouble Falling Asleep No  Trouble Staying Asleep Yes   Frequency: nightly to mess with PAP mask   Snoring no    Equipment issues: The pressure is  acceptable, the mask is acceptable     Review of Systems -   Review of Systems   Constitutional:  Negative for activity change, appetite change, chills, fatigue, fever and unexpected weight change. HENT: Negative. Eyes: Negative. Respiratory:  Negative for cough, shortness of breath and wheezing. Cardiovascular:  Negative for chest pain, palpitations and leg swelling.    Gastrointestinal:  Negative for abdominal

## 2023-09-25 ENCOUNTER — HOSPITAL ENCOUNTER (OUTPATIENT)
Dept: PHYSICAL THERAPY | Age: 53
Setting detail: THERAPIES SERIES
Discharge: HOME | End: 2023-09-25
Payer: COMMERCIAL

## 2023-09-25 PROCEDURE — 97162 PT EVAL MOD COMPLEX 30 MIN: CPT

## 2023-10-03 ENCOUNTER — HOSPITAL ENCOUNTER (OUTPATIENT)
Dept: PHYSICAL THERAPY | Age: 53
Setting detail: THERAPIES SERIES
Discharge: HOME | End: 2023-10-03
Payer: COMMERCIAL

## 2023-10-03 PROCEDURE — 97140 MANUAL THERAPY 1/> REGIONS: CPT

## 2023-10-03 PROCEDURE — 97535 SELF CARE MNGMENT TRAINING: CPT

## 2023-10-03 NOTE — PROGRESS NOTES
[facility]  PHYSICAL THERAPY  [] LYMPHEDEMA SERVICES EVALUATION  [x] DAILY NOTE [] PROGRESS NOTE [] DISCHARGE NOTE    [x] [facility]   [] [facility]    [] [facility]   [] Junior Fix    Date: 10/3/2023  Patient Name:  Doris Goncalves  : 1970  MRN: [id number]  CSN: [id number]    Referring Practitioner Feli Gordon MD   Diagnosis Lymphedema, not elsewhere classified [I89.0]  Hereditary lymphedema [Q82.0]  Generalized edema [R60.1]    Treatment Diagnosis -Hereditary Lymphedema swelling in bilateral lower extremities and lower truncal region. Date of Evaluation 23       Functional Outcome Measure Used LYMPHEDEMA LIFE IMPACT SCALE (LLIS)-Version 2. Functional Outcome Score 35 (23)       Insurance: Primary: Payor: [employer] /  /  / ,   Secondary:    Authorization Information: PRE CERTIFICATION REQUIRED: Yes  INSURANCE COMPANY:   [employer])  Received auth for 12 visits   from 23 through 23. All PT CPT codes are covered under \"umbrella. \"  Auth#         [id number]  Tracking#  [id number]   Visit # 2 (Total treatments + 1 eval)   Visits Allowed: 1/10 (Towards Progress Note);  (Visits allowed)   Recertification Date:  (12 weeks)   Physician Follow-Up: -Patient to schedule follow up appt as appropriate. Physician Orders: -LYMPHEDEMA LE BILATERAL BY CLT (23)      Cancer History No History of Cancer   Prior Lymphedema Treatment -COMPRESSION STOCKINGS: (Knee high; 20-30 mm Hg) Patient reported that the stockings make her legs feel better, but they continue to swell. Primarily worn during work hours. -ELEVATION: The patient reported that she is able to elevate her legs during the day and it makes her legs feel better, with minimal decrease of swelling in legs. -DIET: Regular diet with moderate water intake daily.   She was provided with written handouts on foods to

## 2023-10-09 ENCOUNTER — HOSPITAL ENCOUNTER (OUTPATIENT)
Dept: PHYSICAL THERAPY | Age: 53
Setting detail: THERAPIES SERIES
Discharge: HOME | End: 2023-10-09
Payer: COMMERCIAL

## 2023-10-09 PROCEDURE — 97140 MANUAL THERAPY 1/> REGIONS: CPT

## 2023-10-09 NOTE — PROGRESS NOTES
[address] [facility]  PHYSICAL THERAPY  [] LYMPHEDEMA SERVICES EVALUATION  [x] DAILY NOTE [] PROGRESS NOTE [] DISCHARGE NOTE    [x] [facility] [place]   [] [address] [place] [address]    [] [facility]   [] [facility]    Date: 10/9/2023  Patient Name:  Simran Roberts  : 1970  MRN: [id number]  CSN: [id number]    Referring Practitioner Deanna Murphy MD   Diagnosis Lymphedema, not elsewhere classified [I89.0]  Hereditary lymphedema [Q82.0]  Generalized edema [R60.1]    Treatment Diagnosis -Hereditary Lymphedema swelling in bilateral lower extremities and lower truncal region. Date of Evaluation 23       Functional Outcome Measure Used LYMPHEDEMA LIFE IMPACT SCALE (LLIS)-Version 2. Functional Outcome Score 35 (23)       Insurance: Primary: Payor: Luz Marina Carranza /  /  / ,   Secondary:    Authorization Information: PRE CERTIFICATION REQUIRED: Yes  INSURANCE COMPANY:   [employer] (Jacinto Olivares)  Received auth for 12 visits   from 23 through 23. All PT CPT codes are covered under \"umbrella. \"  Auth#         [id number]  Tracking#  [id number]   Visit # 3 (Total treatments + 1 eval)   Visits Allowed: 2/10 (Towards Progress Note);  (Visits allowed)   Recertification Date:  (12 weeks)   Physician Follow-Up: -Patient to schedule follow up appt as appropriate. Physician Orders: -LYMPHEDEMA LE BILATERAL BY CLT (23)      Cancer History No History of Cancer   Prior Lymphedema Treatment -COMPRESSION STOCKINGS: (Knee high; 20-30 mm Hg) Patient reported that the stockings make her legs feel better, but they continue to swell. Primarily worn during work hours. -ELEVATION: The patient reported that she is able to elevate her legs during the day and it makes her legs feel better, with minimal decrease of swelling in legs. -DIET: Regular diet with moderate water intake daily.   She was provided with written handouts on foods to

## 2023-10-12 ENCOUNTER — HOSPITAL ENCOUNTER (OUTPATIENT)
Dept: PHYSICAL THERAPY | Age: 53
Setting detail: THERAPIES SERIES
Discharge: HOME | End: 2023-10-12
Payer: COMMERCIAL

## 2023-10-12 PROCEDURE — 97110 THERAPEUTIC EXERCISES: CPT

## 2023-10-12 NOTE — PROGRESS NOTES
[facility]  PHYSICAL THERAPY  [] LYMPHEDEMA SERVICES EVALUATION  [x] DAILY NOTE [] PROGRESS NOTE [] DISCHARGE NOTE    [x] [facility] [place]   [] [address] [place] [address]    [] [facility]   [] Jennifer Hernandez    Date: 10/12/2023  Patient Name:  Elysia Vargas  : 1970  MRN: [id number]  CSN: [id number]    Referring Practitioner Kel Tong MD   Diagnosis Lymphedema, not elsewhere classified [I89.0]  Hereditary lymphedema [Q82.0]  Generalized edema [R60.1]    Treatment Diagnosis -Hereditary Lymphedema swelling in bilateral lower extremities and lower truncal region. Date of Evaluation 23       Functional Outcome Measure Used LYMPHEDEMA LIFE IMPACT SCALE (LLIS)-Version 2. Functional Outcome Score 35 (23)       Insurance: Primary: Payor: Violeta Clarke /  /  / ,   Secondary:    Authorization Information: PRE CERTIFICATION REQUIRED: Yes  INSURANCE COMPANY:   [employer] Hayes)  Received auth for 12 visits   from 23 through 23. All PT CPT codes are covered under \"umbrella. \"  Auth#         [id number]  Tracking#  [id number]   Visit # 4 (Total treatments + 1 eval)   Visits Allowed: 3/10 (Towards Progress Note);  (Visits allowed)   Recertification Date:  (12 weeks)   Physician Follow-Up: -Patient to schedule follow up appt as appropriate. Physician Orders: -LYMPHEDEMA LE BILATERAL BY CLT (23)      Cancer History No History of Cancer   Prior Lymphedema Treatment -COMPRESSION STOCKINGS: (Knee high; 20-30 mm Hg) Patient reported that the stockings make her legs feel better, but they continue to swell. Primarily worn during work hours. -ELEVATION: The patient reported that she is able to elevate her legs during the day and it makes her legs feel better, with minimal decrease of swelling in legs. -DIET: Regular diet with moderate water intake daily.   She was provided with written handouts on foods to

## 2023-10-16 ENCOUNTER — HOSPITAL ENCOUNTER (OUTPATIENT)
Dept: PHYSICAL THERAPY | Age: 53
Setting detail: THERAPIES SERIES
End: 2023-10-16
Payer: COMMERCIAL

## 2023-10-19 ENCOUNTER — APPOINTMENT (OUTPATIENT)
Dept: PHYSICAL THERAPY | Age: 53
End: 2023-10-19
Payer: COMMERCIAL

## 2023-10-20 ENCOUNTER — HOSPITAL ENCOUNTER (OUTPATIENT)
Dept: PHYSICAL THERAPY | Age: 53
Setting detail: THERAPIES SERIES
Discharge: HOME | End: 2023-10-20
Payer: COMMERCIAL

## 2023-10-20 PROCEDURE — 97140 MANUAL THERAPY 1/> REGIONS: CPT

## 2023-10-20 NOTE — PROGRESS NOTES
[address] [facility]  PHYSICAL THERAPY  [] LYMPHEDEMA SERVICES EVALUATION  [x] DAILY NOTE [] PROGRESS NOTE [] DISCHARGE NOTE    [x] [facility] [place]   [] [address] [place] [address]    [] [facility]   [] Mimi Goddard    Date: 10/20/2023  Patient Name:  Brigitte Ruiz  : 1970  MRN: [id number]  CSN: [id number]    Referring Practitioner Garett Garcia MD   Diagnosis Lymphedema, not elsewhere classified [I89.0]  Hereditary lymphedema [Q82.0]  Generalized edema [R60.1]    Treatment Diagnosis -Hereditary Lymphedema swelling in bilateral lower extremities and lower truncal region. Date of Evaluation 23       Functional Outcome Measure Used LYMPHEDEMA LIFE IMPACT SCALE (LLIS)-Version 2. Functional Outcome Score 35 (23)       Insurance: Primary: Payor: Eclina All /  /  / ,   Secondary:    Authorization Information: PRE CERTIFICATION REQUIRED: Yes  INSURANCE COMPANY:   [employer] (Sammy Rad)  Received auth for 12 visits   from 23 through 23. All PT CPT codes are covered under \"umbrella. \"  Auth#         [id number]  Tracking#  [id number]   Visit # 4 (Total treatments + 1 eval)   Visits Allowed: 3/10 (Towards Progress Note);  (Visits allowed)   Recertification Date:  (12 weeks)   Physician Follow-Up: -Patient to schedule follow up appt as appropriate. Physician Orders: -LYMPHEDEMA LE BILATERAL BY CLT (23)      Cancer History No History of Cancer   Prior Lymphedema Treatment -COMPRESSION STOCKINGS: (Knee high; 20-30 mm Hg) Patient reported that the stockings make her legs feel better, but they continue to swell. Primarily worn during work hours. -ELEVATION: The patient reported that she is able to elevate her legs during the day and it makes her legs feel better, with minimal decrease of swelling in legs. -DIET: Regular diet with moderate water intake daily.   She was provided with written handouts on foods to

## 2023-10-23 ENCOUNTER — HOSPITAL ENCOUNTER (OUTPATIENT)
Dept: PHYSICAL THERAPY | Age: 53
Setting detail: THERAPIES SERIES
Discharge: HOME | End: 2023-10-23
Payer: COMMERCIAL

## 2023-10-23 PROCEDURE — 97140 MANUAL THERAPY 1/> REGIONS: CPT

## 2023-10-23 PROCEDURE — 97535 SELF CARE MNGMENT TRAINING: CPT

## 2023-10-23 NOTE — PROGRESS NOTES
[facility]  PHYSICAL THERAPY  [] LYMPHEDEMA SERVICES EVALUATION  [x] DAILY NOTE [] PROGRESS NOTE [] DISCHARGE NOTE    [x] [facility] [place]   [] [facility]    [] [facility]   [] Shereen Bath    Date: 10/23/2023  Patient Name:  Jonathan Thapa  : 1970  MRN: [id number]  CSN: [id number]    Referring Practitioner Camden Haq MD   Diagnosis Lymphedema, not elsewhere classified [I89.0]  Hereditary lymphedema [Q82.0]  Generalized edema [R60.1]    Treatment Diagnosis -Hereditary Lymphedema swelling in bilateral lower extremities and lower truncal region. Date of Evaluation 23       Functional Outcome Measure Used LYMPHEDEMA LIFE IMPACT SCALE (LLIS)-Version 2. Functional Outcome Score 35 (23)       Insurance: Primary: Payor: Angela Shaw /  /  / ,   Secondary:    Authorization Information: PRE CERTIFICATION REQUIRED: Yes  INSURANCE COMPANY:   [employer] (Garrick Mazariegos)  Received auth for 12 visits   from 23 through 23. All PT CPT codes are covered under \"umbrella. \"  Auth#         [id number]  Tracking#  [id number]   Visit # 6 (Total treatments + 1 eval)   Visits Allowed: 12 5/10 (Towards Progress Note)   Recertification Date:  (12 weeks)   Physician Follow-Up: -Patient to schedule follow up appt as appropriate. Physician Orders: -LYMPHEDEMA LE BILATERAL BY CLT (23)      Cancer History No History of Cancer   Prior Lymphedema Treatment -COMPRESSION STOCKINGS: (Knee high; 20-30 mm Hg) Patient reported that the stockings make her legs feel better, but they continue to swell. Primarily worn during work hours. -ELEVATION: The patient reported that she is able to elevate her legs during the day and it makes her legs feel better, with minimal decrease of swelling in legs. -DIET: Regular diet with moderate water intake daily.   She was provided with written handouts on foods to avoid increased

## 2023-10-26 ENCOUNTER — HOSPITAL ENCOUNTER (OUTPATIENT)
Dept: PHYSICAL THERAPY | Age: 53
Setting detail: THERAPIES SERIES
Discharge: HOME | End: 2023-10-26
Payer: COMMERCIAL

## 2023-10-26 PROCEDURE — 97535 SELF CARE MNGMENT TRAINING: CPT

## 2023-10-26 PROCEDURE — 97140 MANUAL THERAPY 1/> REGIONS: CPT

## 2023-10-30 ENCOUNTER — HOSPITAL ENCOUNTER (OUTPATIENT)
Dept: PHYSICAL THERAPY | Age: 53
Setting detail: THERAPIES SERIES
Discharge: HOME | End: 2023-10-30
Payer: COMMERCIAL

## 2023-10-30 PROCEDURE — 97140 MANUAL THERAPY 1/> REGIONS: CPT

## 2023-10-30 PROCEDURE — 97535 SELF CARE MNGMENT TRAINING: CPT

## 2023-10-30 NOTE — PROGRESS NOTES
Medial), and Foot/Toes (Anterior, Posterior), Rework: Inguinal Lnn, Axillary Lnn, (IA) Anterior Inguinal to Axillary, Terminus, Profundus, and Effleurage X -POSITION: Supine with head of bed slightly elevated with left knee supported on bolster.    -Educated patient purpose/process of MLD. Physiotouch --               Decongestive Therapy Exercises Exercise Reps Sets Hold   -Ankle pumps 10 1 2                                    X -Ankle pumps to be completed x 10 reps hourly. Specific Interventions Next Treatment:   1) Baseline measurements of proximal thigh/hips/waist.  2) Continue Complete Decongestive Therapy/Manual Lymph Drainage (CDT/MLD) (Unilaterally progressing to Bilaterally). 3) Recommendations for proper compression garments (Stockings versus velcro closure). Activity Tolerance:  [x]  Patient tolerated activity well  []  Patient limited by fatigue  []  Patient limited by pain   []  Patient limited by medical complications  []  Other:     Patient Education:   [x]  HEP/Education Completed: Education on proper self application of kinesiotape. Removal strategies for kinesiotape/residue. [employer] Access Code:  []  No new Education completed  [x]  Reviewed Prior HEP      [x]  Patient verbalized and/or demonstrated understanding of education provided. []  Patient unable to verbalize and/or demonstrate understanding of education provided. Will continue education. []  Barriers to learning: NA    Assessment: Refer to \"Conservative Lymphedema Treatments\" above. GOALS:  Patient Goal: \"I want to keep the swelling down, so I am able to wear my regular size shoes and walk better\" per patient. Short Term Goals:  Time Frame: 6 weeks.    Patient will demonstrate a decrease in circumferential measurements of the affected extremity by 8 cm working towards the lymphedema swelling stabilizing and patient being able to get measured and fitted for a new compression garment to be worn daily to keep

## 2023-11-03 ENCOUNTER — HOSPITAL ENCOUNTER (OUTPATIENT)
Dept: PHYSICAL THERAPY | Age: 53
Setting detail: THERAPIES SERIES
Discharge: HOME | End: 2023-11-03
Payer: COMMERCIAL

## 2023-11-03 PROCEDURE — 97140 MANUAL THERAPY 1/> REGIONS: CPT

## 2023-11-07 ENCOUNTER — HOSPITAL ENCOUNTER (OUTPATIENT)
Dept: PHYSICAL THERAPY | Age: 53
Setting detail: THERAPIES SERIES
Discharge: HOME | End: 2023-11-07
Payer: COMMERCIAL

## 2023-11-07 PROCEDURE — 97140 MANUAL THERAPY 1/> REGIONS: CPT

## 2023-11-07 NOTE — PROGRESS NOTES
garments with modified independence to wear compression garments daily to keep swelling down. (NOT MET: ONGOING)  * Patient/family/caregiver will verbalize a good understanding regarding proper wearing and replacement schedule, precautions and care of garment(s). (NOT MET: ONGOING)  *Patient will demonstrate a decrease of 5 points in LLIS (Version 2) demonstrating improved functional mobility. (MET: Decreased by 17 points in comparison to initial evaluation score.)    PLAN:  []  Plan of care continued. Plan to see patient 2 times per week for 12 weeks to address the treatment planned outlined above-Decrease treatment frequency as appropriate. []  Referral for Sequential Compression Pump (NOTE: INITIATED  E Nora Severino). []  Continue with current plan of care  [x]  Modify plan of care as follows:   (1x/week for 6 weeks-Decrease treatment frequency as appropriate). []  Hold pending physician visit  []  Discharge    THE PATIENT DEMONSTRATES GOOD POTENTIAL TO MAKE GOOD PROGRESS AND MEET ALL GOALS WITH TREATMENTS COMPLETED BY A SKILLED PHYSICAL THERAPIST/PHYSICAL THERAPIST ASSISTANT. Time In 0835   Time Out 0933   Timed Code Minutes: 58 min   Total Treatment Time: 58 min     Aleah Jaquez MD FOR ALLOWING US TO ASSIST IN THE CARE AND TREATMENT OF THIS PATIENT!     Electronically Signed by: Isi Fitzgerald PT, CLT-GAEL, MARY KATE  on 11/7/2023

## 2023-11-13 ENCOUNTER — HOSPITAL ENCOUNTER (OUTPATIENT)
Dept: PHYSICAL THERAPY | Age: 53
Setting detail: THERAPIES SERIES
End: 2023-11-13
Payer: COMMERCIAL

## 2023-11-17 ENCOUNTER — APPOINTMENT (OUTPATIENT)
Dept: PHYSICAL THERAPY | Age: 53
End: 2023-11-17
Payer: COMMERCIAL

## 2023-11-22 ENCOUNTER — HOSPITAL ENCOUNTER (OUTPATIENT)
Dept: PHYSICAL THERAPY | Age: 53
Setting detail: THERAPIES SERIES
Discharge: HOME | End: 2023-11-22
Payer: COMMERCIAL

## 2023-11-22 PROCEDURE — 97535 SELF CARE MNGMENT TRAINING: CPT

## 2023-11-22 PROCEDURE — 97140 MANUAL THERAPY 1/> REGIONS: CPT

## 2023-11-22 NOTE — PROGRESS NOTES
[facility]  PHYSICAL THERAPY  [] LYMPHEDEMA SERVICES EVALUATION  [x] DAILY NOTE [] PROGRESS NOTE [] DISCHARGE NOTE    [x] [facility] [place]   [] [address] [place] [address]    [] [facility]   [] [facility]    Date: 2023  Patient Name:  Ronald Farias  : 1970  MRN: [id number]  CSN: [id number]    Referring Practitioner Gabriela Mancini MD ([facility])   Diagnosis Lymphedema, not elsewhere classified [I89.0]  Hereditary lymphedema [Q82.0]  Generalized edema [R60.1]    Treatment Diagnosis -Hereditary Lymphedema swelling in bilateral lower extremities and lower truncal region. Date of Evaluation 23       Functional Outcome Measure Used LYMPHEDEMA LIFE IMPACT SCALE (LLIS)-Version 2. Functional Outcome Score 35 (23) - Initial Evaluation  18 (23) - Progress Note       Insurance: Primary: [facility] /  /  / ,   Secondary:    Authorization Information: PRE CERTIFICATION REQUIRED: Yes  INSURANCE COMPANY:   [employer])  Received auth for 12 visits   from 23 through 23. All PT CPT codes are covered under \"umbrella. \"  Auth#         [id number]  Tracking#  [id number]   Visit # 11 (Total treatments + 1 eval)   Visits Allowed: 12 10/12 (PROGRESS NOTE)   Recertification Date:  (6 weeks)   Physician Follow-Up: -Patient to schedule follow up appt as appropriate. Physician Orders: -LYMPHEDEMA LE BILATERAL BY CLT (23)      Cancer History No History of Cancer   Prior Lymphedema Treatment -COMPRESSION STOCKINGS: (Knee high; 20-30 mm Hg) Patient reported that the stockings make her legs feel better, but they continue to swell. Primarily worn during work hours. -ELEVATION: The patient reported that she is able to elevate her legs during the day and it makes her legs feel better, with minimal decrease of swelling in legs. -DIET: Regular diet with moderate water intake daily.   She

## 2023-12-05 ENCOUNTER — HOSPITAL ENCOUNTER (OUTPATIENT)
Dept: PHYSICAL THERAPY | Age: 53
Setting detail: THERAPIES SERIES
Discharge: HOME | End: 2023-12-05
Payer: COMMERCIAL

## 2023-12-05 PROCEDURE — 97140 MANUAL THERAPY 1/> REGIONS: CPT

## 2023-12-05 PROCEDURE — 97535 SELF CARE MNGMENT TRAINING: CPT

## 2023-12-06 NOTE — PROGRESS NOTES
[facility]      Elysia Vargas         [id number]  12/7/2023   Chief Complaint   Patient presents with    Follow-up     5mo [place] f/u w/[facility] download. Doing better. Still getting used to. Pt of Dr. Marilee Lopez    PAP Download:   Original or initial AHI: 62.2     Date of initial study: 12/19/2022      Compliant  63%     Noncompliant 37 %     PAP Type BiPAP    Level  10/6 cmH2O   Avg Hrs/Day 4hrs 46mins  AHI: 3.7   Leaks : 95 th percentile: 3.7   Recorded compliance dates , 11/5/23  to 12/4/23   Machine/Mfg:   [x] ResMed    [] Respironics/Dreamstation   Interface:   [] Nasal    [x] Nasal pillows   [] FFM      Provider:      [x] [facility]     []Michele     [] Андрей    [] [facility]    [] [place]               [] [facility]      [] [facility]    [] [facility]:      [] Other    Neck Size: 14.75 inches  Mallampati 4  ESS:  1  SAQLI: 84    Here is a scan of the most recent download:                  Presentation:   Juancho Aguilar presents for 5 month sleep medicine follow up for obstructive sleep apnea  Since the last visit, Juancho Aguilar continues to struggle with getting a 4-hour use with her BiPAP. She does admit she has poor sleep hygiene. She is often staying up late hours of the night and sleeping into the late mornings. Her current mask is comfortable. She is using nasal pillows. She is overall tolerating the pressure. There is nothing in particular that she can state is bothering her. She does often wakes up feeling suffocated and unable to tolerate anything on her face. Is emotional at today's appointment. She is currently unemployed. 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 Her insurance will be changing probably within the next month. She is been trying to work on weight reduction without success.   Her primary care doctor was going to place her on [employer] but due to upcoming changes to insurance

## 2023-12-07 ENCOUNTER — OFFICE VISIT (OUTPATIENT)
Dept: PULMONOLOGY | Age: 53
End: 2023-12-07
Payer: COMMERCIAL

## 2023-12-07 VITALS
SYSTOLIC BLOOD PRESSURE: 128 MMHG | DIASTOLIC BLOOD PRESSURE: 82 MMHG | HEIGHT: 62 IN | WEIGHT: 216.8 LBS | BODY MASS INDEX: 39.9 KG/M2 | HEART RATE: 111 BPM | OXYGEN SATURATION: 97 % | TEMPERATURE: 98.3 F

## 2023-12-07 DIAGNOSIS — E66.01 CLASS 2 SEVERE OBESITY DUE TO EXCESS CALORIES WITH SERIOUS COMORBIDITY AND BODY MASS INDEX (BMI) OF 39.0 TO 39.9 IN ADULT (HCC): ICD-10-CM

## 2023-12-07 DIAGNOSIS — Z78.9 DIFFICULTY WITH BIPAP USE: ICD-10-CM

## 2023-12-07 DIAGNOSIS — G47.33 OSA (OBSTRUCTIVE SLEEP APNEA): Primary | ICD-10-CM

## 2023-12-07 PROCEDURE — 99214 OFFICE O/P EST MOD 30 MIN: CPT | Performed by: NURSE PRACTITIONER

## 2023-12-07 ASSESSMENT — ENCOUNTER SYMPTOMS
WHEEZING: 0
NAUSEA: 0
COUGH: 0
ABDOMINAL PAIN: 0
DIARRHEA: 0
SHORTNESS OF BREATH: 0
EYES NEGATIVE: 1
VOMITING: 0

## 2023-12-14 ENCOUNTER — HOSPITAL ENCOUNTER (OUTPATIENT)
Dept: PHYSICAL THERAPY | Age: 53
Setting detail: THERAPIES SERIES
Discharge: HOME | End: 2023-12-14
Payer: COMMERCIAL

## 2023-12-14 PROCEDURE — 97140 MANUAL THERAPY 1/> REGIONS: CPT

## 2023-12-14 NOTE — PROGRESS NOTES
[facility]  PHYSICAL THERAPY  [] LYMPHEDEMA SERVICES EVALUATION  [x] DAILY NOTE [] PROGRESS NOTE [] DISCHARGE NOTE    [x] [facility] [place]   [] [address] [place] [address]    [] [facility]   [] Sveta Lagos    Date: 2023  Patient Name:  Steven Stephens  : 1970  MRN: [id number]  CSN: [id number]    Referring Practitioner Chay Mercado MD ([facility])   Diagnosis Lymphedema, not elsewhere classified [I89.0]  Hereditary lymphedema [Q82.0]  Generalized edema [R60.1]    Treatment Diagnosis -Hereditary Lymphedema swelling in bilateral lower extremities and lower truncal region. Date of Evaluation 23       Functional Outcome Measure Used LYMPHEDEMA LIFE IMPACT SCALE (LLIS)-Version 2. Functional Outcome Score 35 (23) - Initial Evaluation  18 (23) - Progress Note       Insurance: Primary: Payor: Pawan Collins /  /  / ,   Secondary:    Authorization Information: PRE CERTIFICATION REQUIRED: Yes  INSURANCE COMPANY:   [employer])  Received auth for 12 visits   from 23 through 23. All PT CPT codes are covered under \"umbrella. \"  Auth#         [id number]  Tracking#  [id number]    Received additional information regarding auth. Received auth for 4 PT visits from 23 through 23. All CPT codes covered under umbrella. Please see below auth for additional information regarding the split. They approved 2 visits from 24 through 24 also (below note). Visit # 15, 2/4 (progress note)    Visits Allowed:  4 visits from 23-23, 2 visits from 24- 24 if still has current insurance coverage   Recertification Date:  (6 weeks)   Physician Follow-Up: -Patient to schedule follow up appt as appropriate.    Physician Orders: -LYMPHEDEMA LE BILATERAL BY CLT (23)      Cancer History No History of Cancer   Prior Lymphedema Treatment -COMPRESSION STOCKINGS: (Knee

## 2023-12-19 ENCOUNTER — APPOINTMENT (OUTPATIENT)
Dept: PHYSICAL THERAPY | Age: 53
End: 2023-12-19
Payer: COMMERCIAL

## 2023-12-26 ENCOUNTER — HOSPITAL ENCOUNTER (OUTPATIENT)
Dept: PHYSICAL THERAPY | Age: 53
Setting detail: THERAPIES SERIES
Discharge: HOME | End: 2023-12-26
Payer: COMMERCIAL

## 2023-12-26 PROCEDURE — 97140 MANUAL THERAPY 1/> REGIONS: CPT

## 2023-12-26 PROCEDURE — 97535 SELF CARE MNGMENT TRAINING: CPT

## 2023-12-26 NOTE — DISCHARGE SUMMARY
daily. Other: Compression Pump  X 11/22/23 -Patient reported that she received denial for the sequential compression pump for home use. -[employer] has initiated Insurance appeal.   Other: Therapist recommendation X  11/25/23 -Therapy documentation emailed to [address] [place] [address] [facility]. With recommendation from therapist that the patient is able to obtain a sequential compression pump for home use. Other: Pump X  12/26/23 -Patient reported that the insurance has denied obtaining a compression pump for patient. However, an appeal by the compression pump company as been submitted. Garment Recommended X  10/24/23 -[employer] (Calf Length & Thigh Length) 20-30 mm Hg/Large Petite/Open Toes/Silicone Border at top. Patient Purchasing garment  X 10/24/23 -Patient is currently unemployed. Lorraine Rodríguez process initiated X  10/24/23 -Received invoice from [employer] ([address], [place]) and submitted to [employer] for financial assistance approval.    \"Paid\" invoice  X  11/21/23 -Received \"Paid\" invoice from vendor with email that [facility] compression [facility] [employer]. Other: Compression Garments X  12/26/23 -Issued new compression stockings (knee high and thigh high- refer to Conservative Lymphedema Treatment above). Patient tolerated treatment well. Activity Tolerance:  [x]  Patient tolerated activity well  []  Patient limited by fatigue  []  Patient limited by pain   []  Patient limited by medical complications  []  Other:     Patient Education:   [x]  HEP/Education Completed:Refer to Conservative Lymphedema Treatments above with discharge instructions for home program.   Malinda Rodriguez Access Code:  []  No new Education completed  [x]  Reviewed Prior HEP      [x]  Patient verbalized and/or demonstrated understanding of education provided. []  Patient unable to verbalize and/or demonstrate understanding of education provided. Will continue education.   []  Barriers

## 2024-02-22 ENCOUNTER — HOSPITAL ENCOUNTER (OUTPATIENT)
Dept: WOMENS IMAGING | Age: 54
Discharge: HOME | End: 2024-02-22
Payer: COMMERCIAL

## 2024-02-22 DIAGNOSIS — Z12.31 VISIT FOR SCREENING MAMMOGRAM: ICD-10-CM

## 2024-02-22 PROCEDURE — 77063 BREAST TOMOSYNTHESIS BI: CPT

## 2024-04-25 ENCOUNTER — HOSPITAL ENCOUNTER (EMERGENCY)
Age: 54
Discharge: HOME | End: 2024-04-25
Payer: COMMERCIAL

## 2024-04-25 ENCOUNTER — APPOINTMENT (OUTPATIENT)
Dept: GENERAL RADIOLOGY | Age: 54
End: 2024-04-25
Payer: COMMERCIAL

## 2024-04-25 VITALS
HEIGHT: 62 IN | TEMPERATURE: 98.8 F | RESPIRATION RATE: 16 BRPM | DIASTOLIC BLOOD PRESSURE: 92 MMHG | OXYGEN SATURATION: 98 % | BODY MASS INDEX: 40.48 KG/M2 | WEIGHT: 220 LBS | SYSTOLIC BLOOD PRESSURE: 164 MMHG | HEART RATE: 118 BPM

## 2024-04-25 DIAGNOSIS — R03.0 ELEVATED BLOOD PRESSURE READING: ICD-10-CM

## 2024-04-25 DIAGNOSIS — S80.02XA CONTUSION OF LEFT KNEE, INITIAL ENCOUNTER: ICD-10-CM

## 2024-04-25 DIAGNOSIS — M17.12 OSTEOARTHRITIS OF LEFT KNEE, UNSPECIFIED OSTEOARTHRITIS TYPE: Primary | ICD-10-CM

## 2024-04-25 PROCEDURE — 73564 X-RAY EXAM KNEE 4 OR MORE: CPT

## 2024-04-25 PROCEDURE — 99212 OFFICE O/P EST SF 10 MIN: CPT

## 2024-04-25 PROCEDURE — 99213 OFFICE O/P EST LOW 20 MIN: CPT

## 2024-04-25 ASSESSMENT — PAIN DESCRIPTION - PAIN TYPE: TYPE: ACUTE PAIN

## 2024-04-25 ASSESSMENT — PAIN SCALES - GENERAL: PAINLEVEL_OUTOF10: 7

## 2024-04-25 ASSESSMENT — PAIN DESCRIPTION - ORIENTATION: ORIENTATION: LEFT

## 2024-04-25 ASSESSMENT — PAIN DESCRIPTION - DESCRIPTORS: DESCRIPTORS: ACHING

## 2024-04-25 ASSESSMENT — PAIN DESCRIPTION - LOCATION: LOCATION: KNEE

## 2024-04-25 ASSESSMENT — PAIN - FUNCTIONAL ASSESSMENT: PAIN_FUNCTIONAL_ASSESSMENT: 0-10

## 2024-04-25 NOTE — ED PROVIDER NOTES
[facility] CARE CENTER  Urgent Care Encounter       CHIEF COMPLAINT       Chief Complaint   Patient presents with    Knee Pain     Left knee pain states she fell several weeks ago        Nurses Notes reviewed and I agree except as noted in the HPI.  HISTORY OF PRESENT ILLNESS   Shelby Gonzalez is a 54 y.o. female who presents with complaints of left knee pain and swelling. Reports that two weeks ago she tripped and fell onto concrete on her left knee. Reports pain and swelling went down but bruises remained. Reports she was tolerating pain well but pain came back last night. Reports she barley touched knee against a mattress and pain was shooting. Patient denies pain with ROM, ambulating or twisting. Reports pain only when touching area. Patient denies calf tenderness. Reports pain 7/10 at this time.     The history is provided by the patient.       REVIEW OF SYSTEMS     Review of Systems   Musculoskeletal:  Positive for arthralgias and myalgias.   All other systems reviewed and are negative.      PAST MEDICAL HISTORY         Diagnosis Date    Gastric inflammation     Lymphedema     bilateral legs    Sleep apnea     has Bipap       SURGICALHISTORY     Patient  has a past surgical history that includes TE STEREO BREAST BX W LOC DEVICE 1ST LESION LEFT (Left, 08/13/2019); cervical fusion (09/29/2021); Tonsillectomy and adenoidectomy; Ear tube removal; and laparoscopy (Right, 6/9/2023).    CURRENT MEDICATIONS       Discharge Medication List as of 4/25/2024  7:43 PM        CONTINUE these medications which have NOT CHANGED    Details   omeprazole (PRILOSEC) 40 MG delayed release capsule Take 1 capsule by mouth daily, Disp-90 capsule, R-3Normal      Semaglutide-Weight Management (WEGOVY) 0.25 MG/0.5ML SOAJ SC injection Inject 0.25 mg into the skin every 7 days, Disp-3 mL, R-0Print      CPAP Machine MISC Starting Thu 6/15/2023, Disp-1 each, R-0, PrintPlease change Bipap pressure to IPAP 10 , EPAP 6

## 2024-06-06 ENCOUNTER — HOSPITAL ENCOUNTER (OUTPATIENT)
Dept: PHYSICAL THERAPY | Age: 54
Setting detail: THERAPIES SERIES
Discharge: HOME | End: 2024-06-06
Payer: MEDICAID

## 2024-06-06 PROCEDURE — 97162 PT EVAL MOD COMPLEX 30 MIN: CPT

## 2024-06-06 PROCEDURE — 97110 THERAPEUTIC EXERCISES: CPT

## 2024-06-06 NOTE — PROGRESS NOTES
with current plan of care  []  Modify plan of care as follows:    []  Hold pending physician visit  []  Discharge    Time In 1132   Time Out 1231   Timed Code Minutes: 15 min   Total Treatment Time: 59 min     Electronically Signed by: Yumiko Topete PT

## 2024-06-10 ENCOUNTER — HOSPITAL ENCOUNTER (OUTPATIENT)
Dept: PHYSICAL THERAPY | Age: 54
Setting detail: THERAPIES SERIES
Discharge: HOME | End: 2024-06-10
Payer: MEDICAID

## 2024-06-10 PROCEDURE — 97110 THERAPEUTIC EXERCISES: CPT

## 2024-06-10 NOTE — PROGRESS NOTES
program this date with additional activities.  Slight increase in pain with initiation of nustep, decreasing after rest break.   Patient reports continued aggravating pain limiting ambulating.      Body Structures/Functions/Activity Limitations: impaired activity tolerance, impaired balance, impaired ROM, impaired strength, pain, and abnormal gait  Prognosis: good    GOALS:  Patient Goal: to improve L knee pain     Short Term Goals: 4 weeks  Patient will report decrease in pain to 4-5/10 at most to allow ease of ADLs and household tasks.  2. Patient will improve L knee extension AROM from 8 degrees lacking to 0 degrees to allow normalized gait pattern.  3. Patient will improve L knee flexion AROM from 103 degrees to 110 degrees to allow normalized gait pattern.    Long Term Goals: 8 weeks  Patient will improve L knee strength to 5/5 and B hip strength to 5/5 to allow ease of walking and stair negoation.  2. Patient will be independent with HEP in order to prevent re-injury and improve functional abilities.   3. Patient will perform B SLS for 5-8 seconds compared to 2-3 seconds to improve balance needed for stair negotiation.  4. Patient will improve LEFS score from 38/80 to 48/80 to allow improved functional mobility and ease of recreational activities.  5. Patient will have negative John test on L to improve tightness in L IT band to allow return to walking for exercise with less pain.  6. Patient will be able to ambulate without AD without deviation to allow improved ability to walk for exercise and during work tasks.     Patient Education:   [x]  HEP/Education Completed: Plan of Care, Goals, benefit of PT, attendance policy, HEP handout  [employer] Access Code: [email or web address]  []  No new Education completed  []  Reviewed Prior HEP      [x]  Patient verbalized and/or demonstrated understanding of education provided.  []  Patient unable to verbalize and/or demonstrate understanding of education provided.  Will continue

## 2024-06-20 ENCOUNTER — HOSPITAL ENCOUNTER (OUTPATIENT)
Dept: PHYSICAL THERAPY | Age: 54
Setting detail: THERAPIES SERIES
Discharge: HOME | End: 2024-06-20
Payer: MEDICAID

## 2024-06-20 PROCEDURE — 97110 THERAPEUTIC EXERCISES: CPT

## 2024-06-20 NOTE — PROGRESS NOTES
to progress program this date due to continued anterior lateral joint line pain.  Tenderness upon palpation throughout lateral joint line  + Eleazar test noted.  Educated patient to keep there ex within pain free ROM to decrease repetitive sharp pain occurring at knee.        Body Structures/Functions/Activity Limitations: impaired activity tolerance, impaired balance, impaired ROM, impaired strength, pain, and abnormal gait  Prognosis: good    GOALS:  Patient Goal: to improve L knee pain     Short Term Goals: 4 weeks  Patient will report decrease in pain to 4-5/10 at most to allow ease of ADLs and household tasks.  2. Patient will improve L knee extension AROM from 8 degrees lacking to 0 degrees to allow normalized gait pattern.  3. Patient will improve L knee flexion AROM from 103 degrees to 110 degrees to allow normalized gait pattern.    Long Term Goals: 8 weeks  Patient will improve L knee strength to 5/5 and B hip strength to 5/5 to allow ease of walking and stair negoation.  2. Patient will be independent with HEP in order to prevent re-injury and improve functional abilities.   3. Patient will perform B SLS for 5-8 seconds compared to 2-3 seconds to improve balance needed for stair negotiation.  4. Patient will improve LEFS score from 38/80 to 48/80 to allow improved functional mobility and ease of recreational activities.  5. Patient will have negative John test on L to improve tightness in L IT band to allow return to walking for exercise with less pain.  6. Patient will be able to ambulate without AD without deviation to allow improved ability to walk for exercise and during work tasks.     Patient Education:   [x]  HEP/Education Completed: Plan of Care, Goals, benefit of PT, attendance policy, HEP handout  [employer] Access Code: [email or web address]  []  No new Education completed  []  Reviewed Prior HEP      [x]  Patient verbalized and/or demonstrated understanding of education provided.  []  Patient unable to

## 2024-06-25 ENCOUNTER — HOSPITAL ENCOUNTER (OUTPATIENT)
Dept: PHYSICAL THERAPY | Age: 54
Setting detail: THERAPIES SERIES
Discharge: HOME | End: 2024-06-25
Payer: MEDICAID

## 2024-06-25 PROCEDURE — 97110 THERAPEUTIC EXERCISES: CPT

## 2024-06-25 NOTE — PROGRESS NOTES
[facility]  PHYSICAL THERAPY  [] EVALUATION  [x] DAILY NOTE (LAND) [] DAILY NOTE (AQUATIC ) [] PROGRESS NOTE [] DISCHARGE NOTE    [x] OUTPATIENT REHABILITATION CENTER [facility]   [] [place] [facility]    [] [facility]   [] [facility]    Date: 2024  Patient Name:  Shelby Gonzalez  : 1970  MRN: [id number]  CSN: [id number]    Referring Practitioner Erwin Mercedes DO    Diagnosis Pain in left knee  Unilateral primary osteoarthritis, left knee  Other bursitis of knee, left knee   Treatment Diagnosis M25.562  Left Knee Pain  M25.662 Stiffness of left knee, not elsewhere classified  M25.362 Other instability, left knee  R53.1 Weakness   Date of Evaluation 24    Additional Pertinent History Neck fusion , obesity, arthritis, osteopenia       Functional Outcome Measure Used LEFS   Functional Outcome Score 38/80 (24)       Insurance: Primary: Payor: Count includes the [facility] MEDICAID /  /  / ,   Secondary:    Authorization Information: PRECERTIFICATION REQUIRED:  Auth needed after 30th visit of OT, PT or ST each.  Call [phone] for auth.    INSURANCE THERAPY BENEFIT:  Allowed 30 visits Physical Therapy /Occupational Therapy/Speech Therapy per calendar year.  Auth needed after 30th visit.  No visit limit for PT/OT/ST for patients age 10 and under.  FCE-Covered with no precert required.  Benefit will not cover maintenance or preventative treatment.  AQUATIC THERAPY COVERED:   Yes  MODALITIES COVERED:  Yes. Iontophoresis and Hot/Cold Packs are not covered.   TELEHEALTH COVERED: Yes   Approved Procedure Codes: Authorization of Specific CPT Codes Not Required  (Codes requested indicated by red font, codes approved indicated by black font)   Visit # 4, 4/10 for progress note (Reporting Period: 24 to  )   Visits Allowed: 30 visits    Recertification Date: 24   Physician Follow-Up: 24   Physician Orders:    History of Present Illness: Patient reports

## 2024-06-27 ENCOUNTER — APPOINTMENT (OUTPATIENT)
Dept: PHYSICAL THERAPY | Age: 54
End: 2024-06-27
Payer: MEDICAID

## 2024-07-03 ENCOUNTER — HOSPITAL ENCOUNTER (OUTPATIENT)
Dept: PHYSICAL THERAPY | Age: 54
Setting detail: THERAPIES SERIES
Discharge: HOME | End: 2024-07-03
Payer: MEDICAID

## 2024-07-03 PROCEDURE — 97110 THERAPEUTIC EXERCISES: CPT

## 2024-07-03 NOTE — PROGRESS NOTES
[facility]  PHYSICAL THERAPY  [] EVALUATION  [x] DAILY NOTE (LAND) [] DAILY NOTE (AQUATIC ) [] PROGRESS NOTE [] DISCHARGE NOTE    [x] OUTPATIENT REHABILITATION CENTER [facility]   [] [place] [facility]    [] [facility]   [] [facility]    Date: 7/3/2024  Patient Name:  Shelby Gonzalez  : 1970  MRN: [id number]  CSN: [id number]    Referring Practitioner Erwin Mercedes DO    Diagnosis Pain in left knee  Unilateral primary osteoarthritis, left knee  Other bursitis of knee, left knee   Treatment Diagnosis M25.562  Left Knee Pain  M25.662 Stiffness of left knee, not elsewhere classified  M25.362 Other instability, left knee  R53.1 Weakness   Date of Evaluation 24    Additional Pertinent History Neck fusion , obesity, arthritis, osteopenia       Functional Outcome Measure Used LEFS   Functional Outcome Score 38/80 (24)       Insurance: Primary: Payor: [facility] MEDICAID /  /  / ,   Secondary:    Authorization Information: PRECERTIFICATION REQUIRED:  Auth needed after 30th visit of OT, PT or ST each.  Call [phone] for auth.    INSURANCE THERAPY BENEFIT:  Allowed 30 visits Physical Therapy /Occupational Therapy/Speech Therapy per calendar year.  Auth needed after 30th visit.  No visit limit for PT/OT/ST for patients age 10 and under.  FCE-Covered with no precert required.  Benefit will not cover maintenance or preventative treatment.  AQUATIC THERAPY COVERED:   Yes  MODALITIES COVERED:  Yes. Iontophoresis and Hot/Cold Packs are not covered.   TELEHEALTH COVERED: Yes   Approved Procedure Codes: Authorization of Specific CPT Codes Not Required  (Codes requested indicated by red font, codes approved indicated by black font)   Visit # 5, 5/10 for progress note (Reporting Period: 24 to  )   Visits Allowed: 30 visits    Recertification Date: 24   Physician Follow-Up: 24   Physician Orders:    History of Present Illness: Patient reports

## 2024-07-08 ENCOUNTER — HOSPITAL ENCOUNTER (OUTPATIENT)
Dept: PHYSICAL THERAPY | Age: 54
Setting detail: THERAPIES SERIES
Discharge: HOME | End: 2024-07-08
Payer: MEDICAID

## 2024-07-08 PROCEDURE — 97110 THERAPEUTIC EXERCISES: CPT

## 2024-07-08 NOTE — PROGRESS NOTES
Patient will be able to ambulate without AD without deviation to allow improved ability to walk for exercise and during work tasks.     Patient Education:   [x]  HEP/Education Completed: KT tape  [employer] Access Code: [email or web address]   []  No new Education completed  []  Reviewed Prior HEP      [x]  Patient verbalized and/or demonstrated understanding of education provided.  []  Patient unable to verbalize and/or demonstrate understanding of education provided.  Will continue education.  []  Barriers to learning:     PLAN:  Treatment Recommendations: Strengthening, Range of Motion, Balance Training, Endurance Training, Gait Training, Stair Training, Neuromuscular Re-education, Manual Therapy - Soft Tissue Mobilization, Manual Therapy - Joint Manipulation, Pain Management, Home Exercise Program, Patient Education, Integrative Dry Needling, Aquatics, and Modalities    []  Plan of care initiated.  Plan to see patient 2 times per week for 8 weeks to address the treatment planned outlined above.  [x]  Continue with current plan of care  []  Modify plan of care as follows:    []  Hold pending physician visit  []  Discharge    Time In 1315   Time Out 1355   Timed Code Minutes: 40   Total Treatment Time: 40     Electronically Signed by: Claire Berger PT

## 2024-07-13 ENCOUNTER — HOSPITAL ENCOUNTER (OUTPATIENT)
Age: 54
Discharge: HOME | End: 2024-07-13
Payer: MEDICAID

## 2024-07-13 DIAGNOSIS — Z13.6 SCREENING FOR ISCHEMIC HEART DISEASE: ICD-10-CM

## 2024-07-13 DIAGNOSIS — I10 PRIMARY HYPERTENSION: ICD-10-CM

## 2024-07-13 DIAGNOSIS — E05.00 GRAVES DISEASE: ICD-10-CM

## 2024-07-13 LAB
ANION GAP SERPL CALC-SCNC: 14 MEQ/L (ref 8–16)
BASOPHILS ABSOLUTE: 0.1 THOU/MM3 (ref 0–0.1)
BASOPHILS NFR BLD AUTO: 1.4 %
BUN SERPL-MCNC: 7 MG/DL (ref 7–22)
BURR CELLS BLD QL SMEAR: ABNORMAL
CALCIUM SERPL-MCNC: 8.4 MG/DL (ref 8.5–10.5)
CHLORIDE SERPL-SCNC: 106 MEQ/L (ref 98–111)
CHOLEST SERPL-MCNC: 237 MG/DL (ref 100–199)
CO2 SERPL-SCNC: 20 MEQ/L (ref 23–33)
CREAT SERPL-MCNC: 0.6 MG/DL (ref 0.4–1.2)
DEPRECATED RDW RBC AUTO: 58.4 FL (ref 35–45)
EOSINOPHIL NFR BLD AUTO: 2.7 %
EOSINOPHILS ABSOLUTE: 0.1 THOU/MM3 (ref 0–0.4)
ERYTHROCYTE [DISTWIDTH] IN BLOOD BY AUTOMATED COUNT: 14.3 % (ref 11.5–14.5)
GFR SERPL CREATININE-BSD FRML MDRD: > 90 ML/MIN/1.73M2
GLUCOSE SERPL-MCNC: 111 MG/DL (ref 70–108)
HCT VFR BLD AUTO: 42.1 % (ref 37–47)
HDLC SERPL-MCNC: 60 MG/DL
HGB BLD-MCNC: 13.7 GM/DL (ref 12–16)
IMM GRANULOCYTES # BLD AUTO: 0.04 THOU/MM3 (ref 0–0.07)
IMM GRANULOCYTES NFR BLD AUTO: 0.8 %
LDLC SERPL CALC-MCNC: 140 MG/DL
LYMPHOCYTES ABSOLUTE: 1.7 THOU/MM3 (ref 1–4.8)
LYMPHOCYTES NFR BLD AUTO: 33.7 %
MACROCYTES BLD QL SMEAR: PRESENT
MCH RBC QN AUTO: 35.9 PG (ref 26–33)
MCHC RBC AUTO-ENTMCNC: 32.5 GM/DL (ref 32.2–35.5)
MCV RBC AUTO: 110.2 FL (ref 81–99)
MONOCYTES ABSOLUTE: 0.6 THOU/MM3 (ref 0.4–1.3)
MONOCYTES NFR BLD AUTO: 12.5 %
NEUTROPHILS ABSOLUTE: 2.5 THOU/MM3 (ref 1.8–7.7)
NEUTROPHILS NFR BLD AUTO: 48.9 %
NRBC BLD AUTO-RTO: 0 /100 WBC
PLATELET # BLD AUTO: 212 THOU/MM3 (ref 130–400)
PLATELET BLD QL SMEAR: ADEQUATE
PMV BLD AUTO: 10 FL (ref 9.4–12.4)
POLYCHROMASIA BLD QL SMEAR: ABNORMAL
POTASSIUM SERPL-SCNC: 4.1 MEQ/L (ref 3.5–5.2)
RBC # BLD AUTO: 3.82 MILL/MM3 (ref 4.2–5.4)
SCAN OF BLOOD SMEAR: NORMAL
SODIUM SERPL-SCNC: 140 MEQ/L (ref 135–145)
T4 FREE SERPL-MCNC: 1.2 NG/DL (ref 0.93–1.68)
TRIGL SERPL-MCNC: 184 MG/DL (ref 0–199)
TSH SERPL DL<=0.005 MIU/L-ACNC: 1.1 UIU/ML (ref 0.4–4.2)
WBC # BLD AUTO: 5.1 THOU/MM3 (ref 4.8–10.8)

## 2024-07-13 PROCEDURE — 84443 ASSAY THYROID STIM HORMONE: CPT

## 2024-07-13 PROCEDURE — 36415 COLL VENOUS BLD VENIPUNCTURE: CPT

## 2024-07-13 PROCEDURE — 80048 BASIC METABOLIC PNL TOTAL CA: CPT

## 2024-07-13 PROCEDURE — 85025 COMPLETE CBC W/AUTO DIFF WBC: CPT

## 2024-07-13 PROCEDURE — 84439 ASSAY OF FREE THYROXINE: CPT

## 2024-07-13 PROCEDURE — 80061 LIPID PANEL: CPT

## 2024-07-15 ENCOUNTER — OFFICE VISIT (OUTPATIENT)
Dept: CARDIOLOGY CLINIC | Age: 54
End: 2024-07-15
Payer: MEDICAID

## 2024-07-15 ENCOUNTER — HOSPITAL ENCOUNTER (OUTPATIENT)
Dept: PHYSICAL THERAPY | Age: 54
Setting detail: THERAPIES SERIES
Discharge: HOME | End: 2024-07-15
Payer: MEDICAID

## 2024-07-15 VITALS
DIASTOLIC BLOOD PRESSURE: 90 MMHG | BODY MASS INDEX: 42.1 KG/M2 | WEIGHT: 228.8 LBS | HEIGHT: 62 IN | SYSTOLIC BLOOD PRESSURE: 148 MMHG | HEART RATE: 75 BPM

## 2024-07-15 DIAGNOSIS — Z82.49 FAMILY HISTORY OF PREMATURE CAD: ICD-10-CM

## 2024-07-15 DIAGNOSIS — R42 DIZZINESS ON STANDING: Primary | ICD-10-CM

## 2024-07-15 DIAGNOSIS — I10 PRIMARY HYPERTENSION: ICD-10-CM

## 2024-07-15 DIAGNOSIS — R06.02 SOB (SHORTNESS OF BREATH) ON EXERTION: ICD-10-CM

## 2024-07-15 DIAGNOSIS — E78.5 DYSLIPIDEMIA: ICD-10-CM

## 2024-07-15 DIAGNOSIS — R94.31 ABNORMAL EKG: ICD-10-CM

## 2024-07-15 PROCEDURE — 3080F DIAST BP >= 90 MM HG: CPT | Performed by: INTERNAL MEDICINE

## 2024-07-15 PROCEDURE — 3077F SYST BP >= 140 MM HG: CPT | Performed by: INTERNAL MEDICINE

## 2024-07-15 PROCEDURE — 93000 ELECTROCARDIOGRAM COMPLETE: CPT | Performed by: INTERNAL MEDICINE

## 2024-07-15 PROCEDURE — 97110 THERAPEUTIC EXERCISES: CPT

## 2024-07-15 PROCEDURE — 99214 OFFICE O/P EST MOD 30 MIN: CPT | Performed by: INTERNAL MEDICINE

## 2024-07-15 NOTE — PROGRESS NOTES
Chief Complaint   Patient presents with    1 Year Follow Up    Check-Up         Originally  patient here for check up abn ekg and pre op eval for ovarian cyst laparoscopic surgery      Pt here for a 1 yr f/u    EKG done today    Denied chest pain    Sob on exertion  chronic    No leg edema    Occasional dizziness on sitting or standing mild one short lasting for few yrs- resolved    Occasional palpitation once a months- resolved    Never smoked    Work as     FHX  Mother had had CABG in early 70's  Grandmother had MI in late 50's        Past Surgical History:   Procedure Laterality Date    CERVICAL FUSION  09/29/2021    OIO    EAR TUBE REMOVAL      LAPAROSCOPY Right 6/9/2023    Diagnostic Laparoscopy with Drainage of Right Ovarian Cyst performed by Karina Jimenez DO at [facility] OR    [facility] LOC BREAST BIOPSY LEFT Left 08/13/2019    benign    TONSILLECTOMY AND ADENOIDECTOMY         Allergies   Allergen Reactions    Erythromycin Nausea Only    Tetracyclines & Related Hives        Family History   Problem Relation Age of Onset    Heart Disease Mother     Diabetes Father     High Cholesterol Father     Breast Cancer Paternal Cousin 42        Social History     Socioeconomic History    Marital status: Single     Spouse name: Not on file    Number of children: Not on file    Years of education: Not on file    Highest education level: Not on file   Occupational History    Not on file   Tobacco Use    Smoking status: Never    Smokeless tobacco: Never    Tobacco comments:     Never smoker   Vaping Use    Vaping Use: Never used   Substance and Sexual Activity    Alcohol use: Yes     Comment: 2-3 wine per day    Drug use: Never    Sexual activity: Not on file   Other Topics Concern    Not on file   Social History Narrative    Not on file     Social Determinants of Health     Financial Resource Strain: Low Risk  (9/22/2021)    Overall Financial Resource Strain (CARDIA)     Difficulty of Paying

## 2024-07-15 NOTE — PROGRESS NOTES
without AD without deviation to allow improved ability to walk for exercise and during work tasks.     Patient Education:   [x]  HEP/Education Completed: KT tape  [employer] Access Code: [email or web address]  [email or web address]   []  No new Education completed  []  Reviewed Prior HEP      [x]  Patient verbalized and/or demonstrated understanding of education provided.  []  Patient unable to verbalize and/or demonstrate understanding of education provided.  Will continue education.  []  Barriers to learning:     PLAN:  Treatment Recommendations: Strengthening, Range of Motion, Balance Training, Endurance Training, Gait Training, Stair Training, Neuromuscular Re-education, Manual Therapy - Soft Tissue Mobilization, Manual Therapy - Joint Manipulation, Pain Management, Home Exercise Program, Patient Education, Integrative Dry Needling, Aquatics, and Modalities    []  Plan of care initiated.  Plan to see patient 2 times per week for 8 weeks to address the treatment planned outlined above.  [x]  Continue with current plan of care  []  Modify plan of care as follows:    []  Hold pending physician visit  []  Discharge    Time In 1400   Time Out 1440   Timed Code Minutes: 40   Total Treatment Time: 40     Electronically Signed by: Claire Berger PT

## 2024-07-23 ENCOUNTER — HOSPITAL ENCOUNTER (OUTPATIENT)
Dept: PHYSICAL THERAPY | Age: 54
Setting detail: THERAPIES SERIES
Discharge: HOME | End: 2024-07-23
Payer: MEDICAID

## 2024-07-23 PROCEDURE — 97110 THERAPEUTIC EXERCISES: CPT

## 2024-07-23 NOTE — PROGRESS NOTES
hamstring stretch 3x20s  x    Airex rhomberg 1'x2  x    Nustep  * 7* L2 x    Gait training focusing on = time spent in stance phase 4'  x    Standing:       HR/TR* 15  x    HS curl 10   x    3 way hip L only * no UE  12  x    March L only* 10  x    Standing TKE* 10  x    Standing rhomberg eyes closed with head turns X 5        Specific Interventions Next Treatment: stretching HS/quad/IT band L, L knee AROM, quad strengthening, B hip strengthening, SLR with ER, gait training, balance tasks-SLS/rhomberg/tandem, tape for patellar tracking?, modalities as needed -caution with lymphedema     Activity/Treatment Tolerance:  [x]  Patient tolerated treatment well  []  Patient limited by fatigue  []  Patient limited by pain   []  Patient limited by medical complications  []  Other:     Assessment: Patient able to progress program this date with additional strengthening as indicated in above grid by *. Initiated additional balance activities to improve B LE strength and activity tolerance. HEP updated and reviewed with handouts provided to assist with improvements in balance and decrease risk of falls.   GOALS:  Patient Goal: to improve L knee pain     Short Term Goals: 4 weeks  Patient will report decrease in pain to 4-5/10 at most to allow ease of ADLs and household tasks.  2. Patient will improve L knee extension AROM from 8 degrees lacking to 0 degrees to allow normalized gait pattern.  3. Patient will improve L knee flexion AROM from 103 degrees to 110 degrees to allow normalized gait pattern.    Long Term Goals: 8 weeks  Patient will improve L knee strength to 5/5 and B hip strength to 5/5 to allow ease of walking and stair negoation.  2. Patient will be independent with HEP in order to prevent re-injury and improve functional abilities.   3. Patient will perform B SLS for 5-8 seconds compared to 2-3 seconds to improve balance needed for stair negotiation.  4. Patient will improve LEFS score from 38/80 to 48/80 to allow

## 2024-07-31 ENCOUNTER — OFFICE VISIT (OUTPATIENT)
Dept: PULMONOLOGY | Age: 54
End: 2024-07-31
Payer: MEDICAID

## 2024-07-31 VITALS
WEIGHT: 228.6 LBS | HEART RATE: 94 BPM | OXYGEN SATURATION: 94 % | SYSTOLIC BLOOD PRESSURE: 130 MMHG | BODY MASS INDEX: 42.07 KG/M2 | TEMPERATURE: 98.1 F | DIASTOLIC BLOOD PRESSURE: 88 MMHG | HEIGHT: 62 IN

## 2024-07-31 DIAGNOSIS — Z78.9 DIFFICULTY WITH BIPAP USE: ICD-10-CM

## 2024-07-31 DIAGNOSIS — J20.9 ACUTE BRONCHITIS, UNSPECIFIED ORGANISM: ICD-10-CM

## 2024-07-31 DIAGNOSIS — G47.33 OSA (OBSTRUCTIVE SLEEP APNEA): Primary | ICD-10-CM

## 2024-07-31 DIAGNOSIS — E66.9 OBESITY (BMI 30-39.9): ICD-10-CM

## 2024-07-31 PROCEDURE — 3075F SYST BP GE 130 - 139MM HG: CPT | Performed by: NURSE PRACTITIONER

## 2024-07-31 PROCEDURE — 99214 OFFICE O/P EST MOD 30 MIN: CPT | Performed by: NURSE PRACTITIONER

## 2024-07-31 PROCEDURE — 3079F DIAST BP 80-89 MM HG: CPT | Performed by: NURSE PRACTITIONER

## 2024-07-31 RX ORDER — PREDNISONE 20 MG/1
40 TABLET ORAL DAILY
Qty: 10 TABLET | Refills: 0 | Status: SHIPPED | OUTPATIENT
Start: 2024-07-31 | End: 2024-08-05

## 2024-07-31 RX ORDER — ALBUTEROL SULFATE 90 UG/1
2 AEROSOL, METERED RESPIRATORY (INHALATION) 4 TIMES DAILY PRN
Qty: 18 G | Refills: 5 | Status: SHIPPED | OUTPATIENT
Start: 2024-07-31

## 2024-07-31 ASSESSMENT — ENCOUNTER SYMPTOMS: ALLERGIC/IMMUNOLOGIC NEGATIVE: 1

## 2024-07-31 NOTE — PROGRESS NOTES
pressure therapy with above described pressure.   - She  advised to keep good compliance with current recommended pressure to get optimal results and clinical improvement  -educated on health risks associated with uncontrolled DALLAS   - Recommend 7-9 hours of sleep with PAP  - She was advised to call [employer] regarding supplies if needed.   -She call my office for earlier appointment if needed for worsening of sleep symptoms.       Acute bronchitis, unspecified organism- new   -     albuterol sulfate HFA (VENTOLIN HFA) 108 (90 Base) MCG/ACT inhaler; Inhale 2 puffs into the lungs 4 times daily as needed for Wheezing  -     predniSONE (DELTASONE) 20 MG tablet; Take 2 tablets by mouth daily for 5 days    Difficulty with BiPAP use- persistent   A lot of this is due to her poor sleep hygiene practices.  We discussed good sleep hygiene practices and was sent home with sleep hygiene paper  She is not currently a candidate for the inspire implant secondary to her morbid obesity.  We did discuss weight reduction    Obesity (BMI 30-39.9)- worsening   Pt counseled on obesity, health risks associated with obesity and counseled on need for weight reduction.     Patient verbalizes understanding.  We will see Shelby Gonzalez back in: 6  months  with download    Information added by my medical assistant/LPN was reviewed today    billing based on medical decision making     GILA García-CNP   7/31/2024

## 2024-08-02 ENCOUNTER — APPOINTMENT (OUTPATIENT)
Dept: GENERAL RADIOLOGY | Age: 54
DRG: 055 | End: 2024-08-02
Payer: MEDICAID

## 2024-08-02 ENCOUNTER — HOSPITAL ENCOUNTER (INPATIENT)
Age: 54
LOS: 1 days | Discharge: HOME | DRG: 055 | End: 2024-08-03
Attending: EMERGENCY MEDICINE | Admitting: SURGERY
Payer: MEDICAID

## 2024-08-02 ENCOUNTER — APPOINTMENT (OUTPATIENT)
Dept: CT IMAGING | Age: 54
DRG: 055 | End: 2024-08-02
Payer: MEDICAID

## 2024-08-02 DIAGNOSIS — S00.03XA HEMATOMA OF SCALP, INITIAL ENCOUNTER: ICD-10-CM

## 2024-08-02 DIAGNOSIS — I60.9 SUBARACHNOID HEMORRHAGE (HCC): ICD-10-CM

## 2024-08-02 DIAGNOSIS — S09.90XA INJURY OF HEAD, INITIAL ENCOUNTER: ICD-10-CM

## 2024-08-02 DIAGNOSIS — S01.01XA LACERATION OF SCALP, INITIAL ENCOUNTER: ICD-10-CM

## 2024-08-02 DIAGNOSIS — S06.6X0A: Primary | ICD-10-CM

## 2024-08-02 PROBLEM — M17.12 TRICOMPARTMENT OSTEOARTHRITIS OF LEFT KNEE: Status: ACTIVE | Noted: 2024-08-02

## 2024-08-02 LAB
ANION GAP SERPL CALC-SCNC: 19 MEQ/L (ref 8–16)
BASOPHILS ABSOLUTE: 0 THOU/MM3 (ref 0–0.1)
BASOPHILS NFR BLD AUTO: 0.3 %
BUN SERPL-MCNC: 11 MG/DL (ref 7–22)
CALCIUM SERPL-MCNC: 8.8 MG/DL (ref 8.5–10.5)
CHLORIDE SERPL-SCNC: 107 MEQ/L (ref 98–111)
CO2 SERPL-SCNC: 19 MEQ/L (ref 23–33)
CREAT SERPL-MCNC: 0.7 MG/DL (ref 0.4–1.2)
DEPRECATED RDW RBC AUTO: 56.4 FL (ref 35–45)
EKG ATRIAL RATE: 94 BPM
EKG P AXIS: 42 DEGREES
EKG P-R INTERVAL: 160 MS
EKG Q-T INTERVAL: 350 MS
EKG QRS DURATION: 80 MS
EKG QTC CALCULATION (BAZETT): 437 MS
EKG R AXIS: -6 DEGREES
EKG T AXIS: 13 DEGREES
EKG VENTRICULAR RATE: 94 BPM
EOSINOPHIL NFR BLD AUTO: 0 %
EOSINOPHILS ABSOLUTE: 0 THOU/MM3 (ref 0–0.4)
ERYTHROCYTE [DISTWIDTH] IN BLOOD BY AUTOMATED COUNT: 14.1 % (ref 11.5–14.5)
ETHANOL SERPL-MCNC: 0.07 % (ref 0–0.08)
GFR SERPL CREATININE-BSD FRML MDRD: > 90 ML/MIN/1.73M2
GLUCOSE SERPL-MCNC: 136 MG/DL (ref 70–108)
HCT VFR BLD AUTO: 43.1 % (ref 37–47)
HGB BLD-MCNC: 14.4 GM/DL (ref 12–16)
IMM GRANULOCYTES # BLD AUTO: 0.02 THOU/MM3 (ref 0–0.07)
IMM GRANULOCYTES NFR BLD AUTO: 0.3 %
INR PPP: 0.97 (ref 0.85–1.13)
LYMPHOCYTES ABSOLUTE: 1.4 THOU/MM3 (ref 1–4.8)
LYMPHOCYTES NFR BLD AUTO: 21.1 %
MCH RBC QN AUTO: 36 PG (ref 26–33)
MCHC RBC AUTO-ENTMCNC: 33.4 GM/DL (ref 32.2–35.5)
MCV RBC AUTO: 107.8 FL (ref 81–99)
MONOCYTES ABSOLUTE: 0.6 THOU/MM3 (ref 0.4–1.3)
MONOCYTES NFR BLD AUTO: 9.6 %
NEUTROPHILS ABSOLUTE: 4.5 THOU/MM3 (ref 1.8–7.7)
NEUTROPHILS NFR BLD AUTO: 68.7 %
NRBC BLD AUTO-RTO: 1 /100 WBC
OSMOLALITY SERPL CALC.SUM OF ELEC: 290.2 MOSMOL/KG (ref 275–300)
PLATELET # BLD AUTO: 259 THOU/MM3 (ref 130–400)
PMV BLD AUTO: 9.6 FL (ref 9.4–12.4)
POTASSIUM SERPL-SCNC: 3.9 MEQ/L (ref 3.5–5.2)
RBC # BLD AUTO: 4 MILL/MM3 (ref 4.2–5.4)
SODIUM SERPL-SCNC: 145 MEQ/L (ref 135–145)
T4 FREE SERPL-MCNC: 1.09 NG/DL (ref 0.93–1.68)
TSH SERPL DL<=0.005 MIU/L-ACNC: 0.72 UIU/ML (ref 0.4–4.2)
WBC # BLD AUTO: 6.5 THOU/MM3 (ref 4.8–10.8)

## 2024-08-02 PROCEDURE — 85610 PROTHROMBIN TIME: CPT

## 2024-08-02 PROCEDURE — 2580000003 HC RX 258: Performed by: NURSE PRACTITIONER

## 2024-08-02 PROCEDURE — G0378 HOSPITAL OBSERVATION PER HR: HCPCS

## 2024-08-02 PROCEDURE — 90471 IMMUNIZATION ADMIN: CPT | Performed by: NURSE PRACTITIONER

## 2024-08-02 PROCEDURE — 70496 CT ANGIOGRAPHY HEAD: CPT

## 2024-08-02 PROCEDURE — 36415 COLL VENOUS BLD VENIPUNCTURE: CPT

## 2024-08-02 PROCEDURE — 71046 X-RAY EXAM CHEST 2 VIEWS: CPT

## 2024-08-02 PROCEDURE — 2500000003 HC RX 250 WO HCPCS

## 2024-08-02 PROCEDURE — 99215 OFFICE O/P EST HI 40 MIN: CPT

## 2024-08-02 PROCEDURE — 93005 ELECTROCARDIOGRAM TRACING: CPT | Performed by: NURSE PRACTITIONER

## 2024-08-02 PROCEDURE — 99285 EMERGENCY DEPT VISIT HI MDM: CPT

## 2024-08-02 PROCEDURE — 84443 ASSAY THYROID STIM HORMONE: CPT

## 2024-08-02 PROCEDURE — 70450 CT HEAD/BRAIN W/O DYE: CPT

## 2024-08-02 PROCEDURE — 90714 TD VACC NO PRESV 7 YRS+ IM: CPT | Performed by: NURSE PRACTITIONER

## 2024-08-02 PROCEDURE — 6360000002 HC RX W HCPCS: Performed by: NURSE PRACTITIONER

## 2024-08-02 PROCEDURE — 2000000000 HC ICU R&B

## 2024-08-02 PROCEDURE — 85025 COMPLETE CBC W/AUTO DIFF WBC: CPT

## 2024-08-02 PROCEDURE — 70498 CT ANGIOGRAPHY NECK: CPT

## 2024-08-02 PROCEDURE — 0HQ0XZZ REPAIR SCALP SKIN, EXTERNAL APPROACH: ICD-10-PCS

## 2024-08-02 PROCEDURE — 6370000000 HC RX 637 (ALT 250 FOR IP): Performed by: NURSE PRACTITIONER

## 2024-08-02 PROCEDURE — 80048 BASIC METABOLIC PNL TOTAL CA: CPT

## 2024-08-02 PROCEDURE — 99213 OFFICE O/P EST LOW 20 MIN: CPT | Performed by: NURSE PRACTITIONER

## 2024-08-02 PROCEDURE — 82077 ASSAY SPEC XCP UR&BREATH IA: CPT

## 2024-08-02 PROCEDURE — 93010 ELECTROCARDIOGRAM REPORT: CPT | Performed by: INTERNAL MEDICINE

## 2024-08-02 PROCEDURE — 6360000004 HC RX CONTRAST MEDICATION: Performed by: EMERGENCY MEDICINE

## 2024-08-02 PROCEDURE — 84439 ASSAY OF FREE THYROXINE: CPT

## 2024-08-02 PROCEDURE — 99222 1ST HOSP IP/OBS MODERATE 55: CPT | Performed by: NEUROLOGICAL SURGERY

## 2024-08-02 PROCEDURE — 12002 RPR S/N/AX/GEN/TRNK2.6-7.5CM: CPT

## 2024-08-02 RX ORDER — BISACODYL 10 MG
10 SUPPOSITORY, RECTAL RECTAL DAILY PRN
Status: DISCONTINUED | OUTPATIENT
Start: 2024-08-02 | End: 2024-08-03 | Stop reason: HOSPADM

## 2024-08-02 RX ORDER — ONDANSETRON 4 MG/1
4 TABLET, ORALLY DISINTEGRATING ORAL EVERY 8 HOURS PRN
Status: DISCONTINUED | OUTPATIENT
Start: 2024-08-02 | End: 2024-08-03 | Stop reason: HOSPADM

## 2024-08-02 RX ORDER — MAGNESIUM SULFATE IN WATER 40 MG/ML
2000 INJECTION, SOLUTION INTRAVENOUS PRN
Status: DISCONTINUED | OUTPATIENT
Start: 2024-08-02 | End: 2024-08-03 | Stop reason: HOSPADM

## 2024-08-02 RX ORDER — ONDANSETRON 2 MG/ML
4 INJECTION INTRAMUSCULAR; INTRAVENOUS EVERY 6 HOURS PRN
Status: DISCONTINUED | OUTPATIENT
Start: 2024-08-02 | End: 2024-08-03 | Stop reason: HOSPADM

## 2024-08-02 RX ORDER — BISACODYL 5 MG/1
5 TABLET, DELAYED RELEASE ORAL DAILY PRN
Status: DISCONTINUED | OUTPATIENT
Start: 2024-08-02 | End: 2024-08-03 | Stop reason: HOSPADM

## 2024-08-02 RX ORDER — ALBUTEROL SULFATE 90 UG/1
2 AEROSOL, METERED RESPIRATORY (INHALATION) 4 TIMES DAILY PRN
Status: DISCONTINUED | OUTPATIENT
Start: 2024-08-02 | End: 2024-08-03 | Stop reason: HOSPADM

## 2024-08-02 RX ORDER — POLYETHYLENE GLYCOL 3350 17 G/17G
17 POWDER, FOR SOLUTION ORAL DAILY
Status: DISCONTINUED | OUTPATIENT
Start: 2024-08-02 | End: 2024-08-03 | Stop reason: HOSPADM

## 2024-08-02 RX ORDER — SODIUM CHLORIDE 9 MG/ML
INJECTION, SOLUTION INTRAVENOUS PRN
Status: DISCONTINUED | OUTPATIENT
Start: 2024-08-02 | End: 2024-08-03 | Stop reason: HOSPADM

## 2024-08-02 RX ORDER — PREDNISONE 20 MG/1
40 TABLET ORAL DAILY
Status: DISCONTINUED | OUTPATIENT
Start: 2024-08-03 | End: 2024-08-03 | Stop reason: HOSPADM

## 2024-08-02 RX ORDER — POTASSIUM CHLORIDE 7.45 MG/ML
10 INJECTION INTRAVENOUS PRN
Status: DISCONTINUED | OUTPATIENT
Start: 2024-08-02 | End: 2024-08-03 | Stop reason: HOSPADM

## 2024-08-02 RX ORDER — PANTOPRAZOLE SODIUM 40 MG/1
40 TABLET, DELAYED RELEASE ORAL
Status: DISCONTINUED | OUTPATIENT
Start: 2024-08-03 | End: 2024-08-03 | Stop reason: HOSPADM

## 2024-08-02 RX ORDER — TETANUS AND DIPHTHERIA TOXOIDS ADSORBED 2; 2 [LF]/.5ML; [LF]/.5ML
0.5 INJECTION INTRAMUSCULAR ONCE
Status: DISCONTINUED | OUTPATIENT
Start: 2024-08-02 | End: 2024-08-02 | Stop reason: SDUPTHER

## 2024-08-02 RX ORDER — MULTIVITAMIN WITH IRON
1 TABLET ORAL DAILY
Status: DISCONTINUED | OUTPATIENT
Start: 2024-08-03 | End: 2024-08-03 | Stop reason: HOSPADM

## 2024-08-02 RX ORDER — POTASSIUM CHLORIDE 29.8 MG/ML
20 INJECTION INTRAVENOUS PRN
Status: DISCONTINUED | OUTPATIENT
Start: 2024-08-02 | End: 2024-08-03 | Stop reason: HOSPADM

## 2024-08-02 RX ORDER — SODIUM CHLORIDE 0.9 % (FLUSH) 0.9 %
5-40 SYRINGE (ML) INJECTION PRN
Status: DISCONTINUED | OUTPATIENT
Start: 2024-08-02 | End: 2024-08-03 | Stop reason: HOSPADM

## 2024-08-02 RX ORDER — VALACYCLOVIR HYDROCHLORIDE 500 MG/1
500 TABLET, FILM COATED ORAL DAILY
Status: DISCONTINUED | OUTPATIENT
Start: 2024-08-03 | End: 2024-08-03 | Stop reason: HOSPADM

## 2024-08-02 RX ORDER — ACETAMINOPHEN 325 MG/1
650 TABLET ORAL EVERY 4 HOURS PRN
Status: DISCONTINUED | OUTPATIENT
Start: 2024-08-02 | End: 2024-08-03 | Stop reason: HOSPADM

## 2024-08-02 RX ORDER — HYDROCODONE BITARTRATE AND ACETAMINOPHEN 5; 325 MG/1; MG/1
1 TABLET ORAL EVERY 6 HOURS PRN
Status: DISCONTINUED | OUTPATIENT
Start: 2024-08-02 | End: 2024-08-03 | Stop reason: HOSPADM

## 2024-08-02 RX ORDER — SODIUM CHLORIDE 0.9 % (FLUSH) 0.9 %
5-40 SYRINGE (ML) INJECTION EVERY 12 HOURS SCHEDULED
Status: DISCONTINUED | OUTPATIENT
Start: 2024-08-02 | End: 2024-08-03 | Stop reason: HOSPADM

## 2024-08-02 RX ORDER — MORPHINE SULFATE 2 MG/ML
2 INJECTION, SOLUTION INTRAMUSCULAR; INTRAVENOUS
Status: DISCONTINUED | OUTPATIENT
Start: 2024-08-02 | End: 2024-08-03 | Stop reason: HOSPADM

## 2024-08-02 RX ORDER — SODIUM CHLORIDE 9 MG/ML
INJECTION, SOLUTION INTRAVENOUS CONTINUOUS
Status: DISCONTINUED | OUTPATIENT
Start: 2024-08-02 | End: 2024-08-03 | Stop reason: HOSPADM

## 2024-08-02 RX ORDER — LIDOCAINE HYDROCHLORIDE 10 MG/ML
10 INJECTION, SOLUTION EPIDURAL; INFILTRATION; INTRACAUDAL; PERINEURAL ONCE
Status: COMPLETED | OUTPATIENT
Start: 2024-08-02 | End: 2024-08-02

## 2024-08-02 RX ORDER — METOPROLOL SUCCINATE 100 MG/1
100 TABLET, EXTENDED RELEASE ORAL DAILY
Status: DISCONTINUED | OUTPATIENT
Start: 2024-08-03 | End: 2024-08-03 | Stop reason: HOSPADM

## 2024-08-02 RX ADMIN — SODIUM CHLORIDE: 9 INJECTION, SOLUTION INTRAVENOUS at 20:01

## 2024-08-02 RX ADMIN — LIDOCAINE HYDROCHLORIDE 10 ML: 10 INJECTION, SOLUTION EPIDURAL; INFILTRATION; INTRACAUDAL; PERINEURAL at 15:18

## 2024-08-02 RX ADMIN — CLOSTRIDIUM TETANI TOXOID ANTIGEN (FORMALDEHYDE INACTIVATED) AND CORYNEBACTERIUM DIPHTHERIAE TOXOID ANTIGEN (FORMALDEHYDE INACTIVATED) 0.5 ML: 5; 2 INJECTION, SUSPENSION INTRAMUSCULAR at 19:40

## 2024-08-02 RX ADMIN — HYDROCODONE BITARTRATE AND ACETAMINOPHEN 1 TABLET: 5; 325 TABLET ORAL at 21:21

## 2024-08-02 RX ADMIN — IOPAMIDOL 80 ML: 755 INJECTION, SOLUTION INTRAVENOUS at 15:27

## 2024-08-02 RX ADMIN — ONDANSETRON 4 MG: 4 TABLET, ORALLY DISINTEGRATING ORAL at 21:21

## 2024-08-02 ASSESSMENT — PAIN DESCRIPTION - LOCATION
LOCATION: OTHER (COMMENT)
LOCATION: HEAD

## 2024-08-02 ASSESSMENT — PAIN SCALES - GENERAL
PAINLEVEL_OUTOF10: 5
PAINLEVEL_OUTOF10: 4
PAINLEVEL_OUTOF10: 5
PAINLEVEL_OUTOF10: 5

## 2024-08-02 ASSESSMENT — PAIN - FUNCTIONAL ASSESSMENT
PAIN_FUNCTIONAL_ASSESSMENT: 0-10
PAIN_FUNCTIONAL_ASSESSMENT: 0-10

## 2024-08-02 ASSESSMENT — ENCOUNTER SYMPTOMS
BACK PAIN: 0
COUGH: 1
DIARRHEA: 1
NAUSEA: 0
APNEA: 1
TROUBLE SWALLOWING: 0
SORE THROAT: 0

## 2024-08-02 ASSESSMENT — PAIN DESCRIPTION - ORIENTATION: ORIENTATION: LEFT;UPPER;POSTERIOR

## 2024-08-02 ASSESSMENT — PAIN DESCRIPTION - DESCRIPTORS: DESCRIPTORS: ACHING

## 2024-08-02 NOTE — ED PROVIDER NOTES
[facility] ICU 4D  UrgentCare Encounter      CHIEFCOMPLAINT       Chief Complaint   Patient presents with    Head Injury       Nurses Notes reviewed and I agree except as noted in the HPI.  HISTORY OF PRESENT ILLNESS   Shelby Gonzalez is a 54 y.o. female who presents to urgent care with complaints of a fall down the stairs.  States she was at the top of approximately 10 steps and fell backwards.  She did not tumble.  She states that over the last several months she is just felt \"off kilter\".  She has been doing some gait therapy.  She denies loss of consciousness.  She denies any blood thinners.  She does have a mild headache.      REVIEW OF SYSTEMS     Review of Systems   Skin:  Positive for wound.   Neurological:  Positive for dizziness and headaches.       PAST MEDICAL HISTORY         Diagnosis Date    Arthritis     Gastric inflammation     Lymphedema     bilateral legs    Sleep apnea     has Bipap       SURGICAL HISTORY     Patient  has a past surgical history that includes TE STEREO BREAST BX W LOC DEVICE 1ST LESION LEFT (Left, 08/13/2019); cervical fusion (09/29/2021); Tonsillectomy and adenoidectomy; Ear tube removal; and laparoscopy (Right, 6/9/2023).    CURRENT MEDICATIONS       Current Discharge Medication List        CONTINUE these medications which have NOT CHANGED    Details   albuterol sulfate HFA (VENTOLIN HFA) 108 (90 Base) MCG/ACT inhaler Inhale 2 puffs into the lungs 4 times daily as needed for Wheezing  Qty: 18 g, Refills: 5    Associated Diagnoses: Acute bronchitis, unspecified organism      predniSONE (DELTASONE) 20 MG tablet Take 2 tablets by mouth daily for 5 days  Qty: 10 tablet, Refills: 0    Associated Diagnoses: Acute bronchitis, unspecified organism      loperamide (IMODIUM) 2 MG capsule Take 1 capsule by mouth daily      metoprolol succinate (TOPROL XL) 100 MG extended release tablet Take 1 tablet by mouth daily  Qty: 30 tablet, Refills: 5    Associated Diagnoses: Primary hypertension       omeprazole (PRILOSEC) 40 MG delayed release capsule Take 1 capsule by mouth daily  Qty: 90 capsule, Refills: 3    Associated Diagnoses: Gastroesophageal reflux disease, unspecified whether esophagitis present      CPAP Machine MISC by Does not apply route Please change Bipap pressure to IPAP 10 , EPAP 6  Qty: 1 each, Refills: 0    Associated Diagnoses: Difficulty with BiPAP use; DALLAS (obstructive sleep apnea)      valACYclovir (VALTREX) 500 MG tablet take 1 tablet by mouth once daily      Multiple Vitamins-Minerals (WOMENS MULTI) CAPS Take by mouth daily      Glucos-Chondroit-Collag-Hyal (GLUCOSAMINE CHONDROIT-COLLAGEN PO) Take 1 tablet by mouth daily             ALLERGIES     Patient is is allergic to erythromycin and tetracyclines & related.    FAMILY HISTORY     Patient'sfamily history includes Breast Cancer (age of onset: 42) in her paternal cousin; Colon Cancer in her paternal grandfather; Diabetes in her father; Heart Disease in her mother; High Cholesterol in her father; Osteoporosis in her maternal grandmother and mother.    SOCIAL HISTORY     Patient  reports that she has never smoked. She has never used smokeless tobacco. She reports current alcohol use. She reports that she does not use drugs.    PHYSICAL EXAM     ED TRIAGE VITALS  BP: (!) 152/84, Temp: 98.7 °F (37.1 °C), Pulse: 82, Respirations: 27, SpO2: 96 %  Physical Exam  Vitals and nursing note reviewed.   Constitutional:       General: She is not in acute distress.     Appearance: Normal appearance. She is not ill-appearing or toxic-appearing.   HENT:      Head: Normocephalic and atraumatic.        Comments: Approximate 6 cm laceration to the right side of scalp.  There is also swelling and tenderness present.     Nose: No congestion or rhinorrhea.      Mouth/Throat:      Mouth: Mucous membranes are moist.      Pharynx: Oropharynx is clear.   Eyes:      Extraocular Movements: Extraocular movements intact.      Conjunctiva/sclera: Conjunctivae

## 2024-08-02 NOTE — ED PROVIDER NOTES
[facility] EMERGENCY DEPT  EMERGENCY DEPARTMENT ENCOUNTER          Pt Name: Shelby Gonzalez  MRN: [id number]  Birthdate 1970  Date of evaluation: 8/2/2024  Physician: Dalton Troncoso MD  Supervising Attending Physician: Tang Box DO       CHIEF COMPLAINT       Chief Complaint   Patient presents with    Head Injury         HISTORY OF PRESENT ILLNESS    HPI  Shelby Gonzalez is a 54 y.o. female who presents to the emergency department from home, brought in by EMS for evaluation of fall and head injury    Patient was going up the stairs in her home when she felt unsteady and fell backwards hitting her head.  She was climbing the stairs and fell to the ground from the height of 7-8 stairs.  She had a laceration.  She did not lose consciousness she is not complaining of any weakness numbness or visual defects.  Patient is unsure why she is feeling unsteady.  She reports significant weight gain after menopause for the past 3 years.  She is not on any blood thinners.  The patient has no other acute complaints at this time.      REVIEW OF SYSTEMS   Review of Systems      PAST MEDICAL AND SURGICAL HISTORY     Past Medical History:   Diagnosis Date    Gastric inflammation     Lymphedema     bilateral legs    Sleep apnea     has Bipap     Past Surgical History:   Procedure Laterality Date    CERVICAL FUSION  09/29/2021    OIO    EAR TUBE REMOVAL      LAPAROSCOPY Right 6/9/2023    Diagnostic Laparoscopy with Drainage of Right Ovarian Cyst performed by Karina Jimenez DO at [facility] OR    [facility] LOC BREAST BIOPSY LEFT Left 08/13/2019    benign    TONSILLECTOMY AND ADENOIDECTOMY           MEDICATIONS     Current Facility-Administered Medications:     lidocaine PF 1 % injection 10 mL, 10 mL, IntraDERmal, Once, Dalton Troncoso MD    Current Outpatient Medications:     albuterol sulfate HFA (VENTOLIN HFA) 108 (90 Base) MCG/ACT inhaler, Inhale 2 puffs into the lungs 4 times daily  edema.   Skin:     General: Skin is warm.      Capillary Refill: Capillary refill takes less than 2 seconds.   Neurological:      General: No focal deficit present.      Mental Status: She is alert and oriented to person, place, and time.   Psychiatric:         Mood and Affect: Mood normal.         ED RESULTS   Laboratory results (none if blank):  Labs Reviewed   BASIC METABOLIC PANEL   CBC WITH AUTO DIFFERENTIAL   PROTIME-INR     All laboratory results are individually reviewed and interpreted by me in the clinical context of this patient.  See ED course below for results interpretation if applicable.  (A negative COVID-19 test should be interpreted as COVID no longer suspected unless otherwise noted in this encounter documentation note)  (Any cultures that may have been sent were not resulted at the time of this patient ED visit)      Radiologic studies results available at the moment of this note (None if blank):  CT Head W/O Contrast    (Results Pending)   CTA HEAD NECK W CONTRAST    (Results Pending)     See ED course below for my interpretation if applicable.  All radiology images independently reviewed by me in the clinical context of this patient, in addition to interpretation provided by the radiologist.      EKG interpretation (none if blank):  normal sinus rhythm, rate 94, QRS 80, QTc 437  All EKG results are individually reviewed and interpreted by me in the clinical context of this patient.  All EKGs are also interpreted by our Cardiology department, final interpretation may not be available as of the writing of this note.      PREVIOUS RECORDS  AND EXTERNAL INFORMATION REVIEWED   History obtained from: the patient.    Pertinent previous and/or external records reviewed: Noncontributory.    Case discussed with specialties other than Emergency Medicine: Trauma Surgery      MEDICAL DECISION MAKING   Initial Assessment Summary:   54-year-old female presents after falling and hitting her head with a      Repair method:  Staples    Number of staples:  9  Approximation:     Approximation:  Close  Repair type:     Repair type:  Simple  Post-procedure details:     Procedure completion:  Tolerated  :     CRITICAL CARE:  None    MEDICATION CHANGES     New Prescriptions    No medications on file         FINAL DISPOSITION   Shared Decision-Making was performed, disposition discussed with the patient/family and questions answered.      Outpatient follow up (If applicable):  Sai Harvey MD  [address] [place] [id number]  [phone]    In 1 week      Not applicable              FINAL DIAGNOSES:  Final diagnoses:   Injury of head, initial encounter   Hematoma of scalp, initial encounter   Laceration of scalp, initial encounter       Condition: condition: good  Dispo: Admit to med/surg floor  DISPOSITION Decision To Transfer 08/02/2024 12:11:54 PM      This transcription was electronically signed. It was dictated by use of voice recognition software and electronically transcribed. The transcription may contain errors not detected in proofreading.    Electronically signed by Dalton Troncoso MD on 8/2/24 at 1:42 PM EDT

## 2024-08-02 NOTE — H&P
Ovarian Cyst performed by Karina Jimenez DO at [facility] OR    [facility] LOC BREAST BIOPSY LEFT Left 08/13/2019    benign    TONSILLECTOMY AND ADENOIDECTOMY       Past Medical History:   Diagnosis Date    Arthritis     Gastric inflammation     Lymphedema     bilateral legs    Sleep apnea     has Bipap     Past Surgical History:   Procedure Laterality Date    CERVICAL FUSION  09/29/2021    OIO    EAR TUBE REMOVAL      LAPAROSCOPY Right 6/9/2023    Diagnostic Laparoscopy with Drainage of Right Ovarian Cyst performed by Karina Jimenez DO at [facility] OR    [facility]OT[facility] LOC BREAST BIOPSY LEFT Left 08/13/2019    benign    TONSILLECTOMY AND ADENOIDECTOMY       Social History     Socioeconomic History    Marital status: Single     Spouse name: None    Number of children: None    Years of education: None    Highest education level: None   Tobacco Use    Smoking status: Never    Smokeless tobacco: Never    Tobacco comments:     Never smoker   Vaping Use    Vaping Use: Never used   Substance and Sexual Activity    Alcohol use: Yes     Comment: 2-3 wine per day    Drug use: Never     Social Determinants of Health     Financial Resource Strain: Low Risk  (9/22/2021)    Overall Financial Resource Strain (CARDIA)     Difficulty of Paying Living Expenses: Not hard at all   Food Insecurity: No Food Insecurity (9/22/2021)    Hunger Vital Sign     Worried About Running Out of Food in the Last Year: Never true     Ran Out of Food in the Last Year: Never true     Family History   Problem Relation Age of Onset    Heart Disease Mother     Osteoporosis Mother     Diabetes Father     High Cholesterol Father     Osteoporosis Maternal Grandmother     Colon Cancer Paternal Grandfather     Breast Cancer Paternal Cousin 42       Home medications:    Previous Medications    ALBUTEROL SULFATE HFA (VENTOLIN HFA) 108 (90 BASE) MCG/ACT INHALER    Inhale 2 puffs into the lungs 4 times daily as needed for Wheezing    TSH   Result Value Ref Range    TSH 0.718 0.400 - 4.200 uIU/mL   Basic Metabolic Panel   Result Value Ref Range    Sodium 145 135 - 145 meq/L    Potassium 3.9 3.5 - 5.2 meq/L    Chloride 107 98 - 111 meq/L    CO2 19 (L) 23 - 33 meq/L    Glucose 136 (H) 70 - 108 mg/dL    BUN 11 7 - 22 mg/dL    Creatinine 0.7 0.4 - 1.2 mg/dL    Calcium 8.8 8.5 - 10.5 mg/dL   Anion Gap   Result Value Ref Range    Anion Gap 19.0 (H) 8.0 - 16.0 meq/L   Glomerular Filtration Rate, Estimated   Result Value Ref Range    Est, Glom Filt Rate > 90 >60 ml/min/1.73m2   Osmolality   Result Value Ref Range    Osmolality Calc 290.2 275.0 - 300.0 mOsmol/kg   EKG 12 Lead   Result Value Ref Range    Ventricular Rate 94 BPM    Atrial Rate 94 BPM    P-R Interval 160 ms    QRS Duration 80 ms    Q-T Interval 350 ms    QTc Calculation (Bazett) 437 ms    P Axis 42 degrees    R Axis -6 degrees    T Axis 13 degrees       Physical Exam:  Patient Vitals for the past 24 hrs:   BP Temp Pulse Resp SpO2 Height Weight   08/02/24 1803 121/83 -- 72 16 93 % -- --   08/02/24 1759 121/83 -- 72 24 92 % -- --   08/02/24 1758 -- -- 74 27 93 % -- --   08/02/24 1743 121/81 -- 80 16 95 % -- --   08/02/24 1728 108/89 -- 73 16 93 % -- --   08/02/24 1713 119/70 -- 77 17 92 % -- --   08/02/24 1702 123/78 -- 81 22 97 % -- --   08/02/24 1657 (!) 139/108 -- 80 16 97 % -- --   08/02/24 1644 122/79 -- 86 16 95 % -- --   08/02/24 1643 -- -- -- -- -- 1.575 m (5' 2\") 103.4 kg (228 lb)   08/02/24 1518 -- -- -- 16 -- -- --   08/02/24 1518 -- -- -- -- 92 % -- --   08/02/24 1517 (!) 137/95 -- -- -- -- -- --   08/02/24 1403 -- -- -- 16 -- -- --   08/02/24 1400 (!) 139/92 -- -- -- 93 % -- --   08/02/24 1235 (!) 152/95 -- 95 16 95 % -- --   08/02/24 1200 (!) 138/94 -- -- -- -- -- --   08/02/24 1150 -- 98.5 °F (36.9 °C) 95 16 93 % -- --     Primary Assessment:  Airway: Patent, trachea midline  Breathing: Breath sounds present and equal bilaterally, spontaneous, and unlabored  Circulation:  the head and neck.               **This report has been created using voice recognition software. It may contain   minor errors which are inherent in voice recognition technology.**      Electronically signed by Dr. Ruchi Mcallister      XR CHEST (2 VW)   Final Result   There is no acute intrathoracic process.               **This report has been created using voice recognition software. It may contain   minor errors which are inherent in voice recognition technology.**      Electronically signed by Dr Michael Hairston        Fast Exam: No      10 Minutes spent in patient care collectively between subjective/objective examination, chart review, documentation, clinical reasoning and discussion with attending regarding plan/interval changes.    Patient seen and examined independently by me 8/2/2024     I personally supervised the PA/NP in the evaluation, management and development of the treatment plan for Shelby Gonzalez  on the same date of service as above.      I personally interviewed Shelby Gonzalez   and  discussed his review of symptoms as able due to the patient's condition, as well as performed an individual physical exam on the same   date of service as above.  In addition I discussed the patient's condition and treatment options with the patient, if able, and/or designated family if available.      I have also reviewed and agree with the past medical,  family and social history updates as well as care plans unless otherwise noted below.  All questions were answered.      I examined independently and reviewed relevant data myself and may have done so in the context of team rounds.  A full chart review was performed by me.       I attest that this medical record entry accurately reflects signatures and notations that I made in my capacity as an M.D. when I treated and diagnosed Shelby Gonzalez on the date of service above     I was responsible for all medical decision making involving this

## 2024-08-02 NOTE — DISCHARGE INSTRUCTIONS
Please report to [facility] emergency room immediately with any side effects. PA will reach out to you this week to schedule out patient CT scan.

## 2024-08-02 NOTE — ED NOTES
Patient brought to room from intake. Updated on plan of care. Denies any needs. Bleeding controlled by dressing. Call light in  reach.

## 2024-08-02 NOTE — CONSULTS
[place], [place]                                          NEUROSURGICAL CONSULTATION NOTE       Shelby Gonzalez   YOB: 1970  Account Number: [id number]   Date of Examination: 8/2/2024    ASSESSMENT:    -This is a 54-year-old female s/p fall of 7-8 stairs who underwent a brain CT that showed possible small acute subarachnoid hemorrhage.  -GCS: 15/15  -Focal neurological deficit: No neurological deficit  -The elevated CT showed most stable exam.     PLAN:    -Medical management per ICU team and patient primary.  -Keep systolic blood pressure less than 160 and above 100.  -A dose of TXA to be given to the patient.  -Coagulation profile.  -PT and OT as tolerated  -Resuming of any anticoagulation medication should be based on the risk-benefit ratio.  -Seizure precaution.  -There is no indication for any acute neurosurgical intervention at this time.  -Patient can be discharged from neurosurgical perspective if she is cleared by other medical services  -Patient need to follow-up the results with her primary care provider.  Patient needs to follow-up with neurosurgery outpatient clinic only as needed.  - The case was discussed in detail with the ICU team, trauma team and with the patient and and her nurse.  - From this time on, neurosurgery team will see this patient only as needed as long as she is in the hospital. Please call if you have any further questions or concerns regarding this patient.      HISTORY OF PRESENT ILLNESS:  Shelby Gonzalez is a 54 y.o. female, admitted on :8/2/2024 11:44 AM  This is a 54-year-old female who presented to the ER for evaluation of potential injuries sustained after the patient fell to the ground from the height of 7-8 stairs.  There is no obvious history of loss of consciousness.  There is no history of any focal neurodeficit..  Patient underwent a brain CT that showed possible small Small areas of high attenuation along sulci in the  paramedian left frontal lobe consistent with a small amount of subarachnoid hemorrhage  The repeated brain CT showed stable to improvement exam.    ROS:    Review of Systems   Constitutional:  Negative for fever.   HENT:  Negative for congestion.    Eyes:  Negative for visual disturbance.   Respiratory:  Negative for chest tightness.    Cardiovascular:  Negative for chest pain.   Gastrointestinal:  Negative for abdominal pain.   Genitourinary:  Negative for difficulty urinating.   Musculoskeletal:  Negative for back pain.   Skin:  Positive for wound.   Neurological:  Negative for weakness.   Psychiatric/Behavioral:  Negative for confusion.        PROBLEM LIST:  Patient Active Problem List   Diagnosis    Abnormal EKG    SOB (shortness of breath) on exertion    Dizziness on standing    Family history of premature CAD    Primary hypertension    Dyslipidemia       MEDICATIONS:   Prior to Admission medications    Medication Sig Start Date End Date Taking? Authorizing Provider   albuterol sulfate HFA (VENTOLIN HFA) 108 (90 Base) MCG/ACT inhaler Inhale 2 puffs into the lungs 4 times daily as needed for Wheezing 7/31/24   Viki Morgan APRN - CNP   predniSONE (DELTASONE) 20 MG tablet Take 2 tablets by mouth daily for 5 days 7/31/24 8/5/24  Viki Morgan APRN - CNP   loperamide (IMODIUM) 2 MG capsule Take 1 capsule by mouth daily    ProviderLadarius MD   metoprolol succinate (TOPROL XL) 100 MG extended release tablet Take 1 tablet by mouth daily 7/16/24   Sai Harvey MD   omeprazole (PRILOSEC) 40 MG delayed release capsule Take 1 capsule by mouth daily 12/15/23 12/9/24  Sai Harvey MD   CPAP Machine MISC by Does not apply route Please change Bipap pressure to IPAP 10 , EPAP 6 6/15/23   Vkii Morgan APRN - CNP   valACYclovir (VALTREX) 500 MG tablet take 1 tablet by mouth once daily 10/6/22   ProviderLadarius MD   Multiple Vitamins-Minerals (WOMENS MULTI) CAPS Take by mouth daily    Provider  out   Sensory: grossly intact  Reflexes: 2+ through out.  Planter reflex: Down response   No otorrhea. No rhinorrhea.        *I spend a total of minutes with greater than 60% of time spent face to face, counseling, coordinating care, examining patient, reviewing images and labs personally, and speaking with team.    Bowen Brody MD,MD  Electronically signed 8/2/2024 at ***

## 2024-08-02 NOTE — ED TRIAGE NOTES
About 1/2 hour ago while walking up a flight of stairs(carrying coffee cup) , became unbalanced and fell backwards down about 10 steps, laceration of 7.5 cm noted on top of head and a lump on top left side of head(hurts), no loss consciousness, has been using physical therapy for another fall about 3 weeks ago  involving injury of left knee

## 2024-08-02 NOTE — ED NOTES
This RN in to round. Head wrapped subsequent to lac repair. RR regular and unlabored. Call light in reach.

## 2024-08-02 NOTE — RT PROTOCOL NOTE
RT Inhaler-Nebulizer Bronchodilator Protocol Note    There is a bronchodilator order in the chart from a provider indicating to follow the RT Bronchodilator Protocol and there is an “Initiate RT Inhaler-Nebulizer Bronchodilator Protocol” order as well (see protocol at bottom of note).    CXR Findings:  No results found.    The findings from the last RT Protocol Assessment were as follows:   History Pulmonary Disease: None or smoker <15 pack years  Respiratory Pattern: Regular pattern and RR 12-20 bpm  Breath Sounds: Slightly diminished and/or crackles  Cough: Strong, spontaneous, non-productive  Indication for Bronchodilator Therapy: Decreased or absent breath sounds, None (takes PRN at home.)  Bronchodilator Assessment Score: 2    Aerosolized bronchodilator medication orders have been revised according to the RT Inhaler-Nebulizer Bronchodilator Protocol below.    Respiratory Therapist to perform RT Therapy Protocol Assessment initially then follow the protocol.  Repeat RT Therapy Protocol Assessment PRN for score 0-3 or on second treatment, BID, and PRN for scores above 3.    No Indications - adjust the frequency to every 6 hours PRN wheezing or bronchospasm, if no treatments needed after 48 hours then discontinue using Per Protocol order mode.     If indication present, adjust the RT bronchodilator orders based on the Bronchodilator Assessment Score as indicated below.  Use Inhaler orders unless patient has one or more of the following: on home nebulizer, not able to hold breath for 10 seconds, is not alert and oriented, cannot activate and use MDI correctly, or respiratory rate 25 breaths per minute or more, then use the equivalent nebulizer order(s) with same Frequency and PRN reasons based on the score.  If a patient is on this medication at home then do not decrease Frequency below that used at home.    0-3 - enter or revise RT bronchodilator order(s) to equivalent RT Bronchodilator order with Frequency of  every 4 hours PRN for wheezing or increased work of breathing using Per Protocol order mode.        4-6 - enter or revise RT Bronchodilator order(s) to two equivalent RT bronchodilator orders with one order with BID Frequency and one order with Frequency of every 4 hours PRN wheezing or increased work of breathing using Per Protocol order mode.        7-10 - enter or revise RT Bronchodilator order(s) to two equivalent RT bronchodilator orders with one order with TID Frequency and one order with Frequency of every 4 hours PRN wheezing or increased work of breathing using Per Protocol order mode.       11-13 - enter or revise RT Bronchodilator order(s) to one equivalent RT bronchodilator order with QID Frequency and an Albuterol order with Frequency of every 4 hours PRN wheezing or increased work of breathing using Per Protocol order mode.      Greater than 13 - enter or revise RT Bronchodilator order(s) to one equivalent RT bronchodilator order with every 4 hours Frequency and an Albuterol order with Frequency of every 2 hours PRN wheezing or increased work of breathing using Per Protocol order mode.     RT to enter RT Home Evaluation for COPD & MDI Assessment order using Per Protocol order mode.    Electronically signed by Betina Davalos RCP on 8/2/2024 at 7:39 PM

## 2024-08-02 NOTE — PROGRESS NOTES
Patient arrived to ICU,not endorsing pain at this time, NIH 0 assessed. Patient ambulated to restroom with RN assistance.

## 2024-08-02 NOTE — ED NOTES
NIH and neurovascular assessment done. Swallow screen done. Pt remains alert and oriented x4. RR regular and unlabored. Trauma NP at bedside.

## 2024-08-03 ENCOUNTER — APPOINTMENT (OUTPATIENT)
Dept: CT IMAGING | Age: 54
DRG: 055 | End: 2024-08-03
Payer: MEDICAID

## 2024-08-03 VITALS
RESPIRATION RATE: 18 BRPM | WEIGHT: 228 LBS | OXYGEN SATURATION: 95 % | HEART RATE: 77 BPM | BODY MASS INDEX: 41.96 KG/M2 | DIASTOLIC BLOOD PRESSURE: 102 MMHG | TEMPERATURE: 98 F | SYSTOLIC BLOOD PRESSURE: 140 MMHG | HEIGHT: 62 IN

## 2024-08-03 LAB
AMPHETAMINES UR QL SCN: NEGATIVE
BARBITURATES UR QL SCN: NEGATIVE
BASOPHILS ABSOLUTE: 0 THOU/MM3 (ref 0–0.1)
BASOPHILS NFR BLD AUTO: 0.7 %
BENZODIAZ UR QL SCN: NEGATIVE
BUN SERPL-MCNC: 9 MG/DL (ref 7–22)
BZE UR QL SCN: NEGATIVE
CALCIUM SERPL-MCNC: 8 MG/DL (ref 8.5–10.5)
CANNABINOIDS UR QL SCN: NEGATIVE
CHLORIDE SERPL-SCNC: 106 MEQ/L (ref 98–111)
CO2 SERPL-SCNC: 24 MEQ/L (ref 23–33)
CREAT SERPL-MCNC: 0.6 MG/DL (ref 0.4–1.2)
DEPRECATED RDW RBC AUTO: 56.5 FL (ref 35–45)
EOSINOPHIL NFR BLD AUTO: 1 %
EOSINOPHILS ABSOLUTE: 0.1 THOU/MM3 (ref 0–0.4)
ERYTHROCYTE [DISTWIDTH] IN BLOOD BY AUTOMATED COUNT: 14.3 % (ref 11.5–14.5)
FENTANYL: NEGATIVE
GFR SERPL CREATININE-BSD FRML MDRD: > 90 ML/MIN/1.73M2
GLUCOSE SERPL-MCNC: 125 MG/DL (ref 70–108)
HCT VFR BLD AUTO: 39.7 % (ref 37–47)
HGB BLD-MCNC: 13.3 GM/DL (ref 12–16)
IMM GRANULOCYTES # BLD AUTO: 0.03 THOU/MM3 (ref 0–0.07)
IMM GRANULOCYTES NFR BLD AUTO: 0.4 %
LYMPHOCYTES ABSOLUTE: 1.4 THOU/MM3 (ref 1–4.8)
LYMPHOCYTES NFR BLD AUTO: 21.1 %
MCH RBC QN AUTO: 36 PG (ref 26–33)
MCHC RBC AUTO-ENTMCNC: 33.5 GM/DL (ref 32.2–35.5)
MCV RBC AUTO: 107.6 FL (ref 81–99)
MONOCYTES ABSOLUTE: 0.7 THOU/MM3 (ref 0.4–1.3)
MONOCYTES NFR BLD AUTO: 10 %
MRSA DNA SPEC QL NAA+PROBE: NEGATIVE
NEUTROPHILS ABSOLUTE: 4.5 THOU/MM3 (ref 1.8–7.7)
NEUTROPHILS NFR BLD AUTO: 66.8 %
NRBC BLD AUTO-RTO: 0 /100 WBC
OPIATES UR QL SCN: NEGATIVE
OXYCODONE: NEGATIVE
PCP UR QL SCN: NEGATIVE
PLATELET # BLD AUTO: 227 THOU/MM3 (ref 130–400)
PMV BLD AUTO: 9.1 FL (ref 9.4–12.4)
POTASSIUM SERPL-SCNC: 4.1 MEQ/L (ref 3.5–5.2)
RBC # BLD AUTO: 3.69 MILL/MM3 (ref 4.2–5.4)
SODIUM SERPL-SCNC: 142 MEQ/L (ref 135–145)
WBC # BLD AUTO: 6.8 THOU/MM3 (ref 4.8–10.8)

## 2024-08-03 PROCEDURE — 85025 COMPLETE CBC W/AUTO DIFF WBC: CPT

## 2024-08-03 PROCEDURE — 87641 MR-STAPH DNA AMP PROBE: CPT

## 2024-08-03 PROCEDURE — G0378 HOSPITAL OBSERVATION PER HR: HCPCS

## 2024-08-03 PROCEDURE — 6370000000 HC RX 637 (ALT 250 FOR IP): Performed by: NURSE PRACTITIONER

## 2024-08-03 PROCEDURE — 36415 COLL VENOUS BLD VENIPUNCTURE: CPT

## 2024-08-03 PROCEDURE — 6370000000 HC RX 637 (ALT 250 FOR IP): Performed by: SURGERY

## 2024-08-03 PROCEDURE — 92610 EVALUATE SWALLOWING FUNCTION: CPT

## 2024-08-03 PROCEDURE — 70450 CT HEAD/BRAIN W/O DYE: CPT

## 2024-08-03 PROCEDURE — 80048 BASIC METABOLIC PNL TOTAL CA: CPT

## 2024-08-03 PROCEDURE — 97162 PT EVAL MOD COMPLEX 30 MIN: CPT

## 2024-08-03 PROCEDURE — 2580000003 HC RX 258: Performed by: NURSE PRACTITIONER

## 2024-08-03 PROCEDURE — 92523 SPEECH SOUND LANG COMPREHEN: CPT

## 2024-08-03 PROCEDURE — 97116 GAIT TRAINING THERAPY: CPT

## 2024-08-03 PROCEDURE — 80307 DRUG TEST PRSMV CHEM ANLYZR: CPT

## 2024-08-03 RX ORDER — HYDROCODONE BITARTRATE AND ACETAMINOPHEN 5; 325 MG/1; MG/1
2 TABLET ORAL ONCE
Status: COMPLETED | OUTPATIENT
Start: 2024-08-03 | End: 2024-08-03

## 2024-08-03 RX ADMIN — HYDROCODONE BITARTRATE AND ACETAMINOPHEN 1 TABLET: 5; 325 TABLET ORAL at 07:56

## 2024-08-03 RX ADMIN — SODIUM CHLORIDE: 9 INJECTION, SOLUTION INTRAVENOUS at 06:35

## 2024-08-03 RX ADMIN — HYDROCODONE BITARTRATE AND ACETAMINOPHEN 2 TABLET: 5; 325 TABLET ORAL at 12:22

## 2024-08-03 RX ADMIN — POLYETHYLENE GLYCOL 3350 17 G: 17 POWDER, FOR SOLUTION ORAL at 07:55

## 2024-08-03 RX ADMIN — SODIUM CHLORIDE, PRESERVATIVE FREE 10 ML: 5 INJECTION INTRAVENOUS at 07:55

## 2024-08-03 RX ADMIN — VALACYCLOVIR HYDROCHLORIDE 500 MG: 500 TABLET, FILM COATED ORAL at 07:56

## 2024-08-03 RX ADMIN — METOPROLOL SUCCINATE 100 MG: 100 TABLET, EXTENDED RELEASE ORAL at 07:55

## 2024-08-03 RX ADMIN — PREDNISONE 40 MG: 20 TABLET ORAL at 10:11

## 2024-08-03 RX ADMIN — PANTOPRAZOLE SODIUM 40 MG: 40 TABLET, DELAYED RELEASE ORAL at 07:55

## 2024-08-03 ASSESSMENT — LIFESTYLE VARIABLES
HOW OFTEN DO YOU HAVE A DRINK CONTAINING ALCOHOL: 2-3 TIMES A WEEK
HOW MANY STANDARD DRINKS CONTAINING ALCOHOL DO YOU HAVE ON A TYPICAL DAY: 3 OR 4
HOW MANY STANDARD DRINKS CONTAINING ALCOHOL DO YOU HAVE ON A TYPICAL DAY: PATIENT DOES NOT DRINK
HOW OFTEN DO YOU HAVE A DRINK CONTAINING ALCOHOL: NEVER

## 2024-08-03 ASSESSMENT — PATIENT HEALTH QUESTIONNAIRE - PHQ9
1. LITTLE INTEREST OR PLEASURE IN DOING THINGS: NOT AT ALL
SUM OF ALL RESPONSES TO PHQ QUESTIONS 1-9: 0
2. FEELING DOWN, DEPRESSED OR HOPELESS: NOT AT ALL
SUM OF ALL RESPONSES TO PHQ9 QUESTIONS 1 & 2: 0

## 2024-08-03 ASSESSMENT — PAIN DESCRIPTION - ORIENTATION
ORIENTATION: MID
ORIENTATION: MID

## 2024-08-03 ASSESSMENT — PAIN SCALES - GENERAL
PAINLEVEL_OUTOF10: 5
PAINLEVEL_OUTOF10: 6
PAINLEVEL_OUTOF10: 4

## 2024-08-03 ASSESSMENT — PAIN DESCRIPTION - LOCATION
LOCATION: HEAD

## 2024-08-03 ASSESSMENT — PAIN DESCRIPTION - DESCRIPTORS
DESCRIPTORS: ACHING
DESCRIPTORS: ACHING

## 2024-08-03 NOTE — PROGRESS NOTES
[facility]  Trauma Surgery - Dr.Todd Zhang   Daily Progress Note  Pt Name: Shelby Gonzalez  Medical Record Number: [id number]  Date of Birth 1970   Today's Date: 8/3/2024    HD: # 1    CC:  Mild headache     ASSESSMENT  1.  Active Hospital Problems    Diagnosis Date Noted    Tricompartment osteoarthritis of left knee [M17.12] 08/02/2024    Subarachnoid hemorrhage (HCC) [I60.9] 08/02/2024         PLAN    Trauma secondary to fall down stairs (approx 10)              - admit to ICU under trauma services               - telemetry   - PT/OT /SLP when appropriate     Subarachnoid hemorrhage               - CT imaging concerning for small subarachnoid hemorrhage              - HOLD anti-platelets/blood thinners              - consult Dr Brody              - Repeat CT head in AM               - NPO until cleared by NS               - PT/OT services when able    - 08/03: Repeat CT head is stable/slightly improved per radiologist interpretation. Patient with normal neuro exam. She c/o mild headache which she rates 5-6/10 and states it improves with pain medication. Okay to transfer to stepdown, advance diet to regular, and continue therapy evaluations from trauma perspective if okay with NS team.     Head laceration               - repaired in ER               - keep clean and dry   - staples out in 7-10 days at trauma clinic or PCP     HTN              - continue home medications      Bronchitis              - continue Prednison 20 mg PO daily x 4 days     DVT ppx: SCDs (Lovenox/heparin HELD due to head bleed)  Up as tolerated with assistance  Can advance diet to regular if okay with NS   Full code  PT/OT/SLP      Discussed with Dr Zhang       SUBJECTIVE  Pt doing well. She has been ambulatory with nursing assistance and was independent at home. Still awaiting PT/OT/SLP evaluations. She complains of a mild headache and has been utilizing Tylenol and/or Norco PRN. She has been NPO pending  parenchymal volume loss. 4. Fluid within the left mastoid air cells. This document has been electronically signed by: Kai Paige DO on 08/03/2024 06:05 AM All CTs at this facility use dose modulation techniques and iterative reconstructions, and/or weight-based dosing when appropriate to reduce radiation to a low as reasonably achievable.    CTA HEAD W WO CONTRAST    Result Date: 8/2/2024  PROCEDURE: CTA HEAD W WO CONTRAST, CTA NECK W WO CONTRAST CLINICAL INFORMATION: unsteadiness. Fell. Hit head. Subarachnoid hemorrhage. COMPARISON: Head CT 8/2/2024. TECHNIQUE: 1 mm axial images were obtained through the head and neck after the fast bolus administration of contrast. A noncontrast localizer was obtained. 3-D reconstructions were performed on a dedicated 3-D workstation. These include multiplanar MPR images and multiplanar MIP images.  Centerline reconstructions were obtained of the carotid systems. Isovue intravenous contrast was given. All CT scans at this facility use dose modulation, iterative reconstruction, and/or weight-based dosing when appropriate to reduce radiation dose to as low as reasonably achievable. FINDINGS: CTA NECK: Aortic arch and branches: Aortic arch is normal. The origins of the innominate artery and left common carotid artery are normal. The subclavian artery origins are normal. Right common carotid artery/ICA: The right common carotid artery is normal. The right carotid bulb is normal. The right internal carotid artery origin is normal. There is no stenosis. The right internal carotid artery is normal. Left common carotid artery/ICA: The left common carotid artery is normal. The left carotid bulb is normal. The origin of the left internal carotid artery is normal. There is no stenosis. The left internal carotid artery is normal. Vertebral arteries: The vertebral arteries are codominant and normal. CTA HEAD: Internal carotid arteries: The internal carotid arteries are normal. The proximal  M.D. when I treated and diagnosed Shelby Gonzalez on the date of service above     I was responsible for all medical decision making involving this encounter.      I identified and/or confirmed all problems associated with this patient encounter by my own direct physical examination of this patient and review of all radiology studies and labwork  that were ordered and available.    Active Hospital Problems    Diagnosis     Tricompartment osteoarthritis of left knee [M17.12]     Subarachnoid hemorrhage (HCC) [I60.9]         I  discussed the management of all of the identified problems with the APN or PA.      I formulated the treatment plan for all identified problems and discussed those with the APN or PA .      This management plan was then carried out and the patient's orders for care by the APN or PA.      Total time personally spent on this patient encounter was 35 minutes which includes :  Time does not include procedures.    Please see our orders that were directed and approved by me if there are any new ones for the updated patient care plan.    Above discussed and I agree with documentation and orders placed by Prudence Sanchez PA    See any additional comments if needed below for any other updated orders and plans.      Electronically signed by GILA Marquez - CNP on 8/3/2024 at 7:36 AM  Patient seen and examined independently by me. Above discussed and I agree with CNP.   Labs, cultures, and radiographs where available were reviewed.   See orders for the updated patient care plan.    Alban Zhang MD MD, repeat CT no inc bleed neuro says OK to d/c repeat CT 1 wk will d/c from ICU  8/3/2024   10:45 AM

## 2024-08-03 NOTE — PROGRESS NOTES
[facility]  INPATIENT PHYSICAL THERAPY  EVALUATION  [facility] ICU 4D - 4D-01/001-A    Time In: 08  Time Out: 0851  Timed Code Treatment Minutes: 8 Minutes  Minutes: 28          Date: 8/3/2024  Patient Name: Shelby Gonzalez,  Gender:  female        MRN: [id number]  : 1970  (54 y.o.)      Referring Practitioner: Prudence Sanchez APRN - CNP  Diagnosis: subarachnoid  hemorrhage  Additional Pertinent Hx: Ms. Gonzalez is a 53 yo F with a PMH of HTN and acute on chronic bronchitis who presented to the ER for evaluation of a fall. She reports she was walking up some stairs when she suddenly fell backwards. She reports she has had some issues with balance and ambulating since having a fall a few weeks ago which caused left knee issues. She has not been walking well since that time. Today, she felt her balance give way and she fell backwards, hitting her head on the steps and falling down about 10 steps. She denies LOC; she reports she has a mild headache but no other symptoms. She denies changes in vision, chest pain, shortness of breath, nausea/vomiting, abdominal pain, constipation. She reports having issus with chronic constipation for which she takes laxatives sometimes at home. Denies blood in her stool. Recent CSY (within in 2 years) which did not show anything.      Evaluation in ER was concerning for a subdural hematoma. Trauma was consulted for admission and NS evaluation.CT head MPRESSION:  1. Stable to slightly decreased hyperdense subarachnoid blood within the   sulci of the superior left parasagittal frontal lobe best identified on   image 11 of series 602. No new regions of subarachnoid blood.  2. Soft tissue swelling of the left parietal scalp.  3. Subcortical/periventricular white matter hypoattenuation statistically   representing changes of chronic microvascular ischemia. Age related global   parenchymal volume loss.  4. Fluid within the left mastoid air cells.

## 2024-08-03 NOTE — PROGRESS NOTES
[facility]  OCCUPATIONAL THERAPY MISSED TREATMENT NOTE  [facility] ICU 4D  4D-01/001-A      Date: 8/3/2024  Patient Name: Shelby Gonzalez        CSN: [id number]   : 1970  (54 y.o.)  Gender: female                REASON FOR MISSED TREATMENT:  attempt made to see patient for OT evaluation, however patient had just worked with PT and RN requested OT return later.   Will attempt back next availability       Yazmin Corona, OTR/L [id number]

## 2024-08-03 NOTE — PROGRESS NOTES
Belongings gathered and sent with patient. DC instructions reviewed with patient. All questions answered. IV removed and dressing applied. Pt transported out and d/c to home with family.

## 2024-08-03 NOTE — PROGRESS NOTES
Brief Intervention and Referral to Treatment Summary     Patient was provided PHQ-9, AUDIT-C and DAST Screening:      PHQ-9 Score:  Negative  AUDIT-C Score:  4  DAST Score:   Negative    Patient’s substance use is considered    Moderate Risk    Patient’s depression is considered:-Negative    Minimal  Mild   Moderate  Moderately Severe  Severe    Brief Education Was Provided N/A    Patient was receptive  Patient was not receptive      Brief Intervention Is Provided (Only for AUDIT-C or DAST) N/A    Patient reports readiness to decrease and/or stop use and a plan was discussed   Patient denies readiness to decrease and/or stop use and a plan was not discussed    Injured Trauma Survivor Screening  1.  When you were injured or right afterward   Did you think you were going to die? No  Do you think this was done to you intentionally? No    Since your injury  Have you felt more restless, tense or jumpy than usual? No  Have you found yourself unable to stop worrying? No  Do you find yourself thinking that the world is unsafe and that people are not to be trusted? No    TOTAL SCORE from ITSS Questions 1 and 2:     NOTE: A score of greater than or equal to 2 is considered positive for PTSD risk and is to receive a community resource packet to link with appropriate providers.    Recommendations/Referrals for Brief and/or Specialized Treatment Provided to Patient  N/A

## 2024-08-03 NOTE — PROGRESS NOTES
[facility]  SPEECH THERAPY  [facility] ICU 4D  Speech - Language - Cognitive Evaluation + Clinical Swallow Evaluation    SLP Individual Minutes  Time In: 0745  Time Out: 0806  Minutes: 21  Timed Code Treatment Minutes: 0 Minutes     Speech, Language, Cognitive Evaluation: 13 minutes   Clinical Swallow Evaluation: 8 minutes     DIET ORDER RECOMMENDATIONS AFTER EVALUATION: Regular diet and thin liquids     Date: 8/3/2024  Patient Name: Shelby Gonzalez      CSN: [id number]   : 1970  (54 y.o.)  Gender: female   Referring Physician: Prudence Sanchez, APRN - CNP  Diagnosis: SAH   Precautions: Fall risk  History of Present Illness/Injury: Patient admit to [facility] with above medical dx. Please refer to physician H&P for full patient medical history. Patient admit following fall from stairs leading to SAH.     CTH:      IMPRESSION:  Small areas of high attenuation along sulci in the paramedian left frontal lobe  consistent with a small amount of subarachnoid hemorrhage. There is no  associated mass effect.     No plans for neurosurgical intervention.     Past Medical History:   Diagnosis Date    Arthritis     Gastric inflammation     Lymphedema     bilateral legs    Sleep apnea     has Bipap       Pain: 6/10 - Pain location: headache     Subjective:  Session approved by RNJulien. Patient seen upright in bed. Pleasant and cooperative.     SOCIAL HISTORY:   Living Arrangements: Lives at home in [place] with parents  Work History:  Works at home for [employer]; 20-40 hours/week   Education Level: Bachelor's degree   Driving Status: Active   Finance Management: Independent  Medication Management: Independent  ADL's: Independent.   Hobbies: Watching old movies, cross word puzzles   Vision Status: Glasses   Hearing: WFL       SPEECH / VOICE:  Speech and Voice appear to be grossly intact for basic and complex daily communication    LANGUAGE:  Receptive:  Receptive  language skills appear to be grossly intact for basic and complex daily communication.    Expressive:  Expressive language skills appear to be grossly intact for basic and complex daily communication.    COGNITION:  Metaline Cognitive Assessment (MOCA) version 7.2 completed.  Patient scored 27/30.  Normal is greater than or equal to 26/30.    O-Lo/30  Orientation: 6/6  Immediate Recall: /5  Short-Term Recall: /5  Divergent Naming: x11+ (WFL)  Reasonin/2  Thought Organization: WFL  Attention: WFL  Math Computation: 1/3  Executive Functionin/5     Acute Concussion Evaluation (ACE):   Physical Symptoms: 210  Cognitive Symptoms: 0/4  Emotional Symptoms: 0/4  Sleep Symptoms: 3/4     Acute Concussion Evaluation (ACE) completed this date following admission with documented concern for SAH. Cognitive linguistic evaluation was completed with score of / derived, indicating a functional level. ACE score of  calculated; It should be noted that a score with concerns for concussion are greater than .       SWALLOWING:    Respiratory Status: Room Air      Behavioral Observation: Alert, Oriented, and cooperative    CRANIAL NERVE ASSESSMENT   CN V (Trigeminal) Closes and Opens Mandible WFL    Rotary Jaw Movement WFL      CN VII (Facial) Cheeks Hold Food out of Sulci WFL    Opens, Closes/Seals, Protrudes, Retracts Lips WFL    General Appearance Impaired and slight R facial droop    Sensation Not Tested      CN X (Vagus - Pharyngeal) Raises Back of Tongue Not Tested      CN XI (Accessory) Lifts Soft Palate WFL      CN XII (Hypoglossal) Elevates Tongue Up and Back WFL    Protrusion   WFL    Lateralizes Tongue WFL    Sensation Not Tested      Other Observations Dentition Intact     Vocal Quality WFL    Cough Not tested     PATIENT WAS EVALUATED USING:  Thin Liquids, Puree, and Coarse Solids    ORAL PHASE:  WFL    PHARYNGEAL PHASE:  WFL:  Pharyngeal phase appears WFL but cannot rule out pharyngeal phase

## 2024-08-15 ENCOUNTER — HOSPITAL ENCOUNTER (OUTPATIENT)
Dept: GENERAL RADIOLOGY | Age: 54
Discharge: HOME | End: 2024-08-15
Attending: FAMILY MEDICINE
Payer: MEDICAID

## 2024-08-15 ENCOUNTER — HOSPITAL ENCOUNTER (OUTPATIENT)
Age: 54
Discharge: HOME | End: 2024-08-15
Payer: MEDICAID

## 2024-08-15 ENCOUNTER — HOSPITAL ENCOUNTER (OUTPATIENT)
Dept: CT IMAGING | Age: 54
Discharge: HOME | End: 2024-08-15
Payer: MEDICAID

## 2024-08-15 DIAGNOSIS — R68.84 JAW PAIN: ICD-10-CM

## 2024-08-15 DIAGNOSIS — I60.9 SUBARACHNOID HEMORRHAGE (HCC): ICD-10-CM

## 2024-08-15 PROCEDURE — 70110 X-RAY EXAM OF JAW 4/> VIEWS: CPT

## 2024-08-15 PROCEDURE — 70450 CT HEAD/BRAIN W/O DYE: CPT

## 2024-08-16 ENCOUNTER — OFFICE VISIT (OUTPATIENT)
Dept: SURGERY | Age: 54
End: 2024-08-16
Payer: MEDICAID

## 2024-08-16 VITALS
SYSTOLIC BLOOD PRESSURE: 134 MMHG | RESPIRATION RATE: 18 BRPM | TEMPERATURE: 98.1 F | DIASTOLIC BLOOD PRESSURE: 82 MMHG | OXYGEN SATURATION: 94 % | HEIGHT: 62 IN | HEART RATE: 98 BPM | BODY MASS INDEX: 41.51 KG/M2 | WEIGHT: 225.6 LBS

## 2024-08-16 DIAGNOSIS — Z51.89 VISIT FOR WOUND CHECK: ICD-10-CM

## 2024-08-16 DIAGNOSIS — M25.522 LEFT ELBOW PAIN: ICD-10-CM

## 2024-08-16 DIAGNOSIS — I60.9 SAH (SUBARACHNOID HEMORRHAGE) (HCC): ICD-10-CM

## 2024-08-16 DIAGNOSIS — S01.01XD SCALP LACERATION, SUBSEQUENT ENCOUNTER: Primary | ICD-10-CM

## 2024-08-16 PROCEDURE — 3079F DIAST BP 80-89 MM HG: CPT | Performed by: OCCUPATIONAL THERAPIST

## 2024-08-16 PROCEDURE — 3075F SYST BP GE 130 - 139MM HG: CPT | Performed by: OCCUPATIONAL THERAPIST

## 2024-08-16 PROCEDURE — 99212 OFFICE O/P EST SF 10 MIN: CPT | Performed by: OCCUPATIONAL THERAPIST

## 2024-08-16 RX ORDER — CLOBETASOL PROPIONATE 0.5 MG/G
CREAM TOPICAL
COMMUNITY
Start: 2024-08-02

## 2024-08-16 ASSESSMENT — ENCOUNTER SYMPTOMS
COLOR CHANGE: 0
COUGH: 0
BACK PAIN: 0
ABDOMINAL PAIN: 0
SHORTNESS OF BREATH: 0
VOMITING: 0
NAUSEA: 0

## 2024-10-01 ENCOUNTER — HOSPITAL ENCOUNTER (OUTPATIENT)
Age: 54
Discharge: HOME | End: 2024-10-01
Payer: MEDICAID

## 2024-10-01 ENCOUNTER — HOSPITAL ENCOUNTER (OUTPATIENT)
Dept: GENERAL RADIOLOGY | Age: 54
Discharge: HOME | End: 2024-10-01
Payer: MEDICAID

## 2024-10-01 DIAGNOSIS — R68.84 JAW PAIN: ICD-10-CM

## 2024-10-01 PROCEDURE — 70355 PANORAMIC X-RAY OF JAWS: CPT

## 2024-10-10 ENCOUNTER — HOSPITAL ENCOUNTER (OUTPATIENT)
Dept: GENERAL RADIOLOGY | Age: 54
Discharge: HOME | End: 2024-10-10
Payer: MEDICAID

## 2024-10-10 ENCOUNTER — HOSPITAL ENCOUNTER (OUTPATIENT)
Age: 54
Discharge: HOME | End: 2024-10-10
Payer: MEDICAID

## 2024-10-10 DIAGNOSIS — R68.84 JAW PAIN: ICD-10-CM

## 2024-10-10 PROCEDURE — 70355 PANORAMIC X-RAY OF JAWS: CPT

## 2024-10-25 ENCOUNTER — HOSPITAL ENCOUNTER (OUTPATIENT)
Dept: GENERAL RADIOLOGY | Age: 54
Discharge: HOME | End: 2024-10-25
Payer: MEDICAID

## 2024-10-25 ENCOUNTER — HOSPITAL ENCOUNTER (OUTPATIENT)
Age: 54
Discharge: HOME | End: 2024-10-25
Payer: MEDICAID

## 2024-10-25 DIAGNOSIS — R68.84 JAW PAIN: ICD-10-CM

## 2024-10-25 PROCEDURE — 70355 PANORAMIC X-RAY OF JAWS: CPT

## 2024-10-25 PROCEDURE — 70330 X-RAY EXAM OF JAW JOINTS: CPT

## 2025-01-29 NOTE — PROGRESS NOTES
[facility]      Shelby Gonzalez         [id number]  1/30/2025   Chief Complaint   Patient presents with    Follow-up     6mo [place] f/u w/[facility] download.  Not doing well.  States she is getting \"frustrated\" with the PAP.  Needs script for PAP supplies.         Pt of Dr. Stuart     PAP Download:   Original or initial AHI: 62.2     Date of initial study: 12/19/22        Compliant  47%     Noncompliant 53 %     PAP Type BiPAP   Level  10/6 cmH2O   Avg Hrs/Day 4hrs 57mins  AHI: 5.7   Leaks : 95 th percentile: 7.5   Recorded compliance dates , 12/28/24  to 1/26/25   Machine/Mfg:   [x] ResMed    [] Respironics/Dreamstation   Interface:   [] Nasal    [x] Nasal pillows   [] FFM      Provider:      [x] [facility]     []Michele     [] [employer]    [] [employer]    [] [facility]               [] [facility]      [] [employer]    [] [place]:      [] Other    Neck Size: 15 inches  Mallampati 4  ESS:  0  SAQLI: 84    Here is a scan of the most recent download:                  Presentation:   [facility]  Since the last visit, Shelby is more frustrated with her PAP therapy . Struggling with use   admits to poor sleep hygiene, does not have a consistent bedtime.   C/o cough , NP. Gets winded with activity .   It's a pain to where but does feel benefit with use.   BP reading is elevated today   AHI little elevated from last visit, but her 4 hour compliance is also down   Does not tolerate FFM , so far NP have been the best.   Went 2 nights without BIPAP and \" felt terrrible, so I know it helps\"   Nothing in particular to fix, it's just the whole PAP thing   She was going to try Mounjaro injections for weight reduction in the past but could not get insurance to cover.  She is struggling to lose weight on her own    Progress History:   Since last visit any new medical issues? No  Any trouble with Machine No  Any new

## 2025-01-30 ENCOUNTER — OFFICE VISIT (OUTPATIENT)
Dept: PULMONOLOGY | Age: 55
End: 2025-01-30
Payer: MEDICAID

## 2025-01-30 VITALS
HEART RATE: 63 BPM | BODY MASS INDEX: 41.55 KG/M2 | SYSTOLIC BLOOD PRESSURE: 140 MMHG | DIASTOLIC BLOOD PRESSURE: 96 MMHG | HEIGHT: 62 IN | WEIGHT: 225.8 LBS | OXYGEN SATURATION: 95 % | TEMPERATURE: 98.4 F

## 2025-01-30 DIAGNOSIS — E66.01 MORBID OBESITY WITH BMI OF 40.0-44.9, ADULT: ICD-10-CM

## 2025-01-30 DIAGNOSIS — G47.33 OSA (OBSTRUCTIVE SLEEP APNEA): Primary | ICD-10-CM

## 2025-01-30 DIAGNOSIS — R03.0 ELEVATED BLOOD PRESSURE READING: ICD-10-CM

## 2025-01-30 DIAGNOSIS — Z78.9 DIFFICULTY WITH BIPAP USE: ICD-10-CM

## 2025-01-30 PROCEDURE — 3080F DIAST BP >= 90 MM HG: CPT | Performed by: NURSE PRACTITIONER

## 2025-01-30 PROCEDURE — 99214 OFFICE O/P EST MOD 30 MIN: CPT | Performed by: NURSE PRACTITIONER

## 2025-01-30 PROCEDURE — 3077F SYST BP >= 140 MM HG: CPT | Performed by: NURSE PRACTITIONER

## 2025-01-30 PROCEDURE — G2211 COMPLEX E/M VISIT ADD ON: HCPCS | Performed by: NURSE PRACTITIONER

## 2025-02-24 ENCOUNTER — HOSPITAL ENCOUNTER (OUTPATIENT)
Dept: MAMMOGRAPHY | Age: 55
Discharge: HOME | End: 2025-02-24
Payer: MEDICAID

## 2025-02-24 DIAGNOSIS — Z12.39 BREAST SCREENING: ICD-10-CM

## 2025-02-24 PROCEDURE — 77063 BREAST TOMOSYNTHESIS BI: CPT

## 2025-04-10 ENCOUNTER — OFFICE VISIT (OUTPATIENT)
Age: 55
End: 2025-04-10
Payer: MEDICAID

## 2025-04-10 VITALS
WEIGHT: 229.4 LBS | BODY MASS INDEX: 42.21 KG/M2 | HEART RATE: 71 BPM | HEIGHT: 62 IN | SYSTOLIC BLOOD PRESSURE: 128 MMHG | OXYGEN SATURATION: 97 % | TEMPERATURE: 97.7 F | DIASTOLIC BLOOD PRESSURE: 84 MMHG

## 2025-04-10 DIAGNOSIS — E66.01 MORBID OBESITY WITH BMI OF 40.0-44.9, ADULT: ICD-10-CM

## 2025-04-10 DIAGNOSIS — G47.33 OSA (OBSTRUCTIVE SLEEP APNEA): Primary | ICD-10-CM

## 2025-04-10 DIAGNOSIS — Z78.9 DIFFICULTY WITH BIPAP USE: ICD-10-CM

## 2025-04-10 PROCEDURE — 99214 OFFICE O/P EST MOD 30 MIN: CPT | Performed by: NURSE PRACTITIONER

## 2025-04-10 PROCEDURE — 3074F SYST BP LT 130 MM HG: CPT | Performed by: NURSE PRACTITIONER

## 2025-04-10 PROCEDURE — 3079F DIAST BP 80-89 MM HG: CPT | Performed by: NURSE PRACTITIONER

## 2025-04-10 NOTE — PROGRESS NOTES
continue current positive airway pressure therapy with above described pressure.   - She  advised to keep good compliance with current recommended pressure to get optimal results and clinical improvement  - Recommend 7-9 hours of sleep with PAP  - She was advised to call [employer] company regarding supplies if needed.   -She call my office for earlier appointment if needed for worsening of sleep symptoms.     Difficulty with BiPAP use- persistent   Strongly encouraged need for physical activity , establishing an exercise routine along with dietary changes to loose weight   Needs to get BMI < 40 for FDA requirements for [employer].   Will need repeat PSG in future once getting BMI lowered   Educated on cardiovascular risks of untreated sleep apnea and pts often find it more difficult to loose weight when [place] not under good control     Morbid obesity with BMI of 40.0-44.9, adult-stable   Declines referral to weight loss clinic    Patient verbalizes understanding.  We will see Shelby Gonzalez back in: 1 year with download    Information added by my medical assistant/LPN was reviewed today    billing based on medical decision making     GILA García-CNP   4/10/2025